# Patient Record
Sex: FEMALE | Race: WHITE | NOT HISPANIC OR LATINO | URBAN - METROPOLITAN AREA
[De-identification: names, ages, dates, MRNs, and addresses within clinical notes are randomized per-mention and may not be internally consistent; named-entity substitution may affect disease eponyms.]

---

## 2023-02-27 PROBLEM — H52.4 PRESBYOPIA OF BOTH EYES: Status: ACTIVE | Noted: 2023-02-27

## 2023-02-27 PROBLEM — N39.3 FEMALE GENUINE STRESS INCONTINENCE: Status: ACTIVE | Noted: 2023-02-27

## 2023-02-27 PROBLEM — K64.0 GRADE I HEMORRHOIDS: Status: ACTIVE | Noted: 2023-02-27

## 2023-02-27 PROBLEM — R31.21 ASYMPTOMATIC MICROSCOPIC HEMATURIA: Status: ACTIVE | Noted: 2023-02-27

## 2023-02-27 PROBLEM — R10.2 FEMALE PELVIC PAIN: Status: ACTIVE | Noted: 2023-02-27

## 2023-02-27 PROBLEM — S86.919A KNEE STRAIN: Status: ACTIVE | Noted: 2023-02-27

## 2023-02-27 PROBLEM — M20.61 TOE DEFORMITY, RIGHT: Status: ACTIVE | Noted: 2023-02-27

## 2023-02-27 PROBLEM — M20.5X1 HALLUX LIMITUS, RIGHT: Status: ACTIVE | Noted: 2023-02-27

## 2023-02-27 PROBLEM — B37.31 YEAST VAGINITIS: Status: ACTIVE | Noted: 2023-02-27

## 2023-02-27 PROBLEM — L90.0 LICHEN SCLEROSUS: Status: ACTIVE | Noted: 2023-02-27

## 2023-02-27 PROBLEM — N81.11 MIDLINE CYSTOCELE: Status: ACTIVE | Noted: 2023-02-27

## 2023-02-27 PROBLEM — R06.83 SNORING: Status: ACTIVE | Noted: 2023-02-27

## 2023-02-27 PROBLEM — N81.6 RECTOCELE, FEMALE: Status: ACTIVE | Noted: 2023-02-27

## 2023-02-27 PROBLEM — N85.2 UTERINE ENLARGEMENT: Status: ACTIVE | Noted: 2023-02-27

## 2023-02-27 PROBLEM — L30.9 ECZEMA: Status: ACTIVE | Noted: 2023-02-27

## 2023-02-27 PROBLEM — J02.9 PHARYNGITIS: Status: ACTIVE | Noted: 2023-02-27

## 2023-02-27 PROBLEM — L73.9 FOLLICULITIS: Status: ACTIVE | Noted: 2023-02-27

## 2023-02-27 PROBLEM — M54.12 CERVICAL RADICULITIS: Status: ACTIVE | Noted: 2023-02-27

## 2023-02-27 PROBLEM — N95.2 VAGINAL ATROPHY: Status: ACTIVE | Noted: 2023-02-27

## 2023-02-27 PROBLEM — R92.8 ABNORMAL MAMMOGRAM: Status: ACTIVE | Noted: 2023-02-27

## 2023-02-27 PROBLEM — N39.0 URINARY TRACT INFECTION: Status: ACTIVE | Noted: 2023-02-27

## 2023-02-27 PROBLEM — D48.5 NEOPLASM OF UNCERTAIN BEHAVIOR OF SKIN: Status: ACTIVE | Noted: 2023-02-27

## 2023-02-27 PROBLEM — H69.90 EUSTACHIAN TUBE DYSFUNCTION: Status: ACTIVE | Noted: 2023-02-27

## 2023-02-27 PROBLEM — N39.0 RECURRENT UTI: Status: ACTIVE | Noted: 2023-02-27
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
## 2023-02-27 PROBLEM — H93.8X9 EAR PRESSURE: Status: ACTIVE | Noted: 2023-02-27

## 2023-02-27 PROBLEM — M25.512 LEFT SHOULDER PAIN: Status: ACTIVE | Noted: 2023-02-27

## 2023-02-27 PROBLEM — M79.676 TOE PAIN: Status: ACTIVE | Noted: 2023-02-27

## 2023-02-27 PROBLEM — N81.9 VAGINAL VAULT PROLAPSE: Status: ACTIVE | Noted: 2023-02-27

## 2023-02-27 PROBLEM — H43.811 PVD (POSTERIOR VITREOUS DETACHMENT), RIGHT EYE: Status: ACTIVE | Noted: 2023-02-27

## 2023-02-27 PROBLEM — N81.10 VAGINAL PROLAPSE: Status: ACTIVE | Noted: 2023-02-27

## 2023-02-27 PROBLEM — J34.89 NASAL SORE: Status: ACTIVE | Noted: 2023-02-27

## 2023-02-27 PROBLEM — R82.90 URINE ABNORMALITY: Status: ACTIVE | Noted: 2023-02-27

## 2023-02-27 PROBLEM — J32.9 SINUS INFECTION: Status: ACTIVE | Noted: 2023-02-27

## 2023-02-27 RX ORDER — CLOTRIMAZOLE AND BETAMETHASONE DIPROPIONATE 10; .64 MG/G; MG/G
1 CREAM TOPICAL 2 TIMES DAILY
COMMUNITY
Start: 2019-08-20

## 2023-02-27 RX ORDER — MELOXICAM 15 MG/1
15 TABLET ORAL DAILY
COMMUNITY
Start: 2022-08-03 | End: 2023-09-22 | Stop reason: ALTCHOICE

## 2023-04-18 ENCOUNTER — OFFICE VISIT (OUTPATIENT)
Dept: PRIMARY CARE | Facility: CLINIC | Age: 58
End: 2023-04-18
Payer: COMMERCIAL

## 2023-04-18 VITALS — TEMPERATURE: 97.8 F

## 2023-04-18 DIAGNOSIS — Z23 NEED FOR VACCINATION: Primary | ICD-10-CM

## 2023-04-18 PROCEDURE — 90750 HZV VACC RECOMBINANT IM: CPT | Performed by: FAMILY MEDICINE

## 2023-04-18 PROCEDURE — 90471 IMMUNIZATION ADMIN: CPT | Performed by: FAMILY MEDICINE

## 2023-05-05 ENCOUNTER — TELEPHONE (OUTPATIENT)
Dept: PRIMARY CARE | Facility: CLINIC | Age: 58
End: 2023-05-05
Payer: COMMERCIAL

## 2023-05-05 NOTE — TELEPHONE ENCOUNTER
Patient called into office because when she was at her OBGYN office her BP was recorded as high.  Patient states 150/? But did not remember the bottom number.  Wanted to come in to have a BP check.  Checked with MA and [facility] and call the office.  Patient was informed but said she did not want to do that.

## 2023-09-05 ENCOUNTER — TELEPHONE (OUTPATIENT)
Dept: PRIMARY CARE | Facility: CLINIC | Age: 58
End: 2023-09-05
Payer: COMMERCIAL

## 2023-09-05 DIAGNOSIS — Z00.00 ROUTINE GENERAL MEDICAL EXAMINATION AT A HEALTH CARE FACILITY: Primary | ICD-10-CM

## 2023-09-22 ENCOUNTER — OFFICE VISIT (OUTPATIENT)
Dept: PRIMARY CARE | Facility: CLINIC | Age: 58
End: 2023-09-22
Payer: COMMERCIAL

## 2023-09-22 VITALS
RESPIRATION RATE: 17 BRPM | SYSTOLIC BLOOD PRESSURE: 130 MMHG | TEMPERATURE: 98 F | HEIGHT: 67 IN | WEIGHT: 192.8 LBS | DIASTOLIC BLOOD PRESSURE: 70 MMHG | OXYGEN SATURATION: 97 % | HEART RATE: 64 BPM | BODY MASS INDEX: 30.26 KG/M2

## 2023-09-22 DIAGNOSIS — Z00.00 ANNUAL PHYSICAL EXAM: Primary | ICD-10-CM

## 2023-09-22 DIAGNOSIS — Z12.11 COLON CANCER SCREENING: ICD-10-CM

## 2023-09-22 PROBLEM — N85.2 UTERINE ENLARGEMENT: Status: RESOLVED | Noted: 2023-02-27 | Resolved: 2023-09-22

## 2023-09-22 PROBLEM — M54.12 CERVICAL RADICULITIS: Status: RESOLVED | Noted: 2023-02-27 | Resolved: 2023-09-22

## 2023-09-22 PROBLEM — J32.9 SINUS INFECTION: Status: RESOLVED | Noted: 2023-02-27 | Resolved: 2023-09-22
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
## 2023-09-22 PROBLEM — L73.9 FOLLICULITIS: Status: RESOLVED | Noted: 2023-02-27 | Resolved: 2023-09-22

## 2023-09-22 PROBLEM — R92.8 ABNORMAL MAMMOGRAM: Status: RESOLVED | Noted: 2023-02-27 | Resolved: 2023-09-22

## 2023-09-22 PROBLEM — M79.676 TOE PAIN: Status: RESOLVED | Noted: 2023-02-27 | Resolved: 2023-09-22

## 2023-09-22 PROBLEM — J34.89 NASAL SORE: Status: RESOLVED | Noted: 2023-02-27 | Resolved: 2023-09-22

## 2023-09-22 PROBLEM — M25.512 LEFT SHOULDER PAIN: Status: RESOLVED | Noted: 2023-02-27 | Resolved: 2023-09-22

## 2023-09-22 PROBLEM — N39.0 URINARY TRACT INFECTION: Status: RESOLVED | Noted: 2023-02-27 | Resolved: 2023-09-22

## 2023-09-22 PROBLEM — N81.11 MIDLINE CYSTOCELE: Status: RESOLVED | Noted: 2023-02-27 | Resolved: 2023-09-22

## 2023-09-22 PROBLEM — M20.61 TOE DEFORMITY, RIGHT: Status: RESOLVED | Noted: 2023-02-27 | Resolved: 2023-09-22

## 2023-09-22 PROBLEM — N39.0 RECURRENT UTI: Status: RESOLVED | Noted: 2023-02-27 | Resolved: 2023-09-22

## 2023-09-22 PROBLEM — L90.0 LICHEN SCLEROSUS: Status: RESOLVED | Noted: 2023-02-27 | Resolved: 2023-09-22

## 2023-09-22 PROBLEM — N81.6 RECTOCELE, FEMALE: Status: RESOLVED | Noted: 2023-02-27 | Resolved: 2023-09-22

## 2023-09-22 PROBLEM — H93.8X9 EAR PRESSURE: Status: RESOLVED | Noted: 2023-02-27 | Resolved: 2023-09-22

## 2023-09-22 PROBLEM — H43.811 PVD (POSTERIOR VITREOUS DETACHMENT), RIGHT EYE: Status: RESOLVED | Noted: 2023-02-27 | Resolved: 2023-09-22

## 2023-09-22 PROBLEM — H52.4 PRESBYOPIA OF BOTH EYES: Status: RESOLVED | Noted: 2023-02-27 | Resolved: 2023-09-22

## 2023-09-22 PROBLEM — D48.5 NEOPLASM OF UNCERTAIN BEHAVIOR OF SKIN: Status: RESOLVED | Noted: 2023-02-27 | Resolved: 2023-09-22

## 2023-09-22 PROBLEM — R82.90 URINE ABNORMALITY: Status: RESOLVED | Noted: 2023-02-27 | Resolved: 2023-09-22

## 2023-09-22 PROBLEM — R10.2 FEMALE PELVIC PAIN: Status: RESOLVED | Noted: 2023-02-27 | Resolved: 2023-09-22

## 2023-09-22 PROBLEM — H69.90 EUSTACHIAN TUBE DYSFUNCTION: Status: RESOLVED | Noted: 2023-02-27 | Resolved: 2023-09-22

## 2023-09-22 PROBLEM — S86.919A KNEE STRAIN: Status: RESOLVED | Noted: 2023-02-27 | Resolved: 2023-09-22

## 2023-09-22 PROBLEM — J02.9 PHARYNGITIS: Status: RESOLVED | Noted: 2023-02-27 | Resolved: 2023-09-22

## 2023-09-22 PROBLEM — M20.5X1 HALLUX LIMITUS, RIGHT: Status: RESOLVED | Noted: 2023-02-27 | Resolved: 2023-09-22

## 2023-09-22 PROBLEM — N81.9 VAGINAL VAULT PROLAPSE: Status: RESOLVED | Noted: 2023-02-27 | Resolved: 2023-09-22

## 2023-09-22 PROBLEM — K64.0 GRADE I HEMORRHOIDS: Status: RESOLVED | Noted: 2023-02-27 | Resolved: 2023-09-22

## 2023-09-22 PROBLEM — N81.10 VAGINAL PROLAPSE: Status: RESOLVED | Noted: 2023-02-27 | Resolved: 2023-09-22

## 2023-09-22 PROBLEM — R31.21 ASYMPTOMATIC MICROSCOPIC HEMATURIA: Status: RESOLVED | Noted: 2023-02-27 | Resolved: 2023-09-22

## 2023-09-22 PROBLEM — B37.31 YEAST VAGINITIS: Status: RESOLVED | Noted: 2023-02-27 | Resolved: 2023-09-22

## 2023-09-22 PROCEDURE — 1036F TOBACCO NON-USER: CPT | Performed by: FAMILY MEDICINE

## 2023-09-22 PROCEDURE — 99396 PREV VISIT EST AGE 40-64: CPT | Performed by: FAMILY MEDICINE

## 2023-09-22 ASSESSMENT — PATIENT HEALTH QUESTIONNAIRE - PHQ9
8. MOVING OR SPEAKING SO SLOWLY THAT OTHER PEOPLE COULD HAVE NOTICED. OR THE OPPOSITE, BEING SO FIGETY OR RESTLESS THAT YOU HAVE BEEN MOVING AROUND A LOT MORE THAN USUAL: NOT AT ALL
5. POOR APPETITE OR OVEREATING: NOT AT ALL
4. FEELING TIRED OR HAVING LITTLE ENERGY: NOT AT ALL
3. TROUBLE FALLING OR STAYING ASLEEP OR SLEEPING TOO MUCH: NOT AT ALL
1. LITTLE INTEREST OR PLEASURE IN DOING THINGS: NOT AT ALL
6. FEELING BAD ABOUT YOURSELF - OR THAT YOU ARE A FAILURE OR HAVE LET YOURSELF OR YOUR FAMILY DOWN: NOT AT ALL
SUM OF ALL RESPONSES TO PHQ9 QUESTIONS 1 AND 2: 0
7. TROUBLE CONCENTRATING ON THINGS, SUCH AS READING THE NEWSPAPER OR WATCHING TELEVISION: NOT AT ALL
SUM OF ALL RESPONSES TO PHQ QUESTIONS 1-9: 0
9. THOUGHTS THAT YOU WOULD BE BETTER OFF DEAD, OR OF HURTING YOURSELF: NOT AT ALL
10. IF YOU CHECKED OFF ANY PROBLEMS, HOW DIFFICULT HAVE THESE PROBLEMS MADE IT FOR YOU TO DO YOUR WORK, TAKE CARE OF THINGS AT HOME, OR GET ALONG WITH OTHER PEOPLE: NOT DIFFICULT AT ALL
2. FEELING DOWN, DEPRESSED OR HOPELESS: NOT AT ALL

## 2023-09-22 NOTE — ASSESSMENT & PLAN NOTE
Declines flu vaccine.  Schedule fasting labs, mammogram and colonoscopy.  She will call for colonoscopy scheduling.  Continue regular physical activity and diet changes.  Return in 1 year or as needed

## 2023-09-22 NOTE — PROGRESS NOTES
Subjective   Patient ID: Gracie Landaverde is a 58 y.o. female who presents for No chief complaint on file..    Here for CPE.    Last Pap through GYN 4/23  Mammogram 11/22  Colonoscopy 9/18 recheck in 5, due this year    Seeing gyn regular  Was to get surg and still considering for prolapse.    Weight up slight from last check  Trying to limit sugar   Walking regular, occ strength training.  Brushing teeth, seeing dentist  Sleeping well.  Vision stable  Hearing normal.   No tobacco, no etoh, no drugs  Using premarin vag cream 1-2 times a week  No longer needing mobic for knee.  Taking vit D collagen powder.   Declines flu vaccine.             Review of Systems    Objective   There were no vitals taken for this visit.    Physical Exam  Vitals and nursing note reviewed.   Constitutional:       Appearance: Normal appearance.   HENT:      Head: Normocephalic.      Right Ear: Tympanic membrane normal.      Left Ear: Tympanic membrane normal.      Nose: Nose normal.      Mouth/Throat:      Mouth: Mucous membranes are dry.   Eyes:      Extraocular Movements: Extraocular movements intact.      Pupils: Pupils are equal, round, and reactive to light.   Cardiovascular:      Rate and Rhythm: Normal rate and regular rhythm.      Pulses: Normal pulses.   Pulmonary:      Effort: Pulmonary effort is normal.      Breath sounds: Normal breath sounds.   Abdominal:      General: Abdomen is flat. Bowel sounds are normal.      Palpations: Abdomen is soft.   Musculoskeletal:         General: Normal range of motion.      Cervical back: Normal range of motion.   Skin:     General: Skin is warm and dry.   Neurological:      General: No focal deficit present.      Mental Status: She is alert and oriented to person, place, and time.   Psychiatric:         Mood and Affect: Mood normal.         Behavior: Behavior normal.         Thought Content: Thought content normal.         Judgment: Judgment normal.       Assessment/Plan   Problem List  Items Addressed This Visit       Annual physical exam - Primary     Declines flu vaccine.  Schedule fasting labs, mammogram and colonoscopy.  She will call for colonoscopy scheduling.  Continue regular physical activity and diet changes.  Return in 1 year or as needed         Relevant Orders    CBC and Auto Differential    Comprehensive Metabolic Panel    Lipid Panel

## 2023-09-23 ENCOUNTER — LAB (OUTPATIENT)
Dept: LAB | Facility: LAB | Age: 58
End: 2023-09-23
Payer: COMMERCIAL

## 2023-09-23 DIAGNOSIS — Z00.00 ANNUAL PHYSICAL EXAM: ICD-10-CM

## 2023-09-23 LAB
BASOPHILS (10*3/UL) IN BLOOD BY AUTOMATED COUNT: 0.05 X10E9/L (ref 0–0.1)
BASOPHILS/100 LEUKOCYTES IN BLOOD BY AUTOMATED COUNT: 0.7 % (ref 0–2)
EOSINOPHILS (10*3/UL) IN BLOOD BY AUTOMATED COUNT: 0.36 X10E9/L (ref 0–0.7)
EOSINOPHILS/100 LEUKOCYTES IN BLOOD BY AUTOMATED COUNT: 4.8 % (ref 0–6)
ERYTHROCYTE DISTRIBUTION WIDTH (RATIO) BY AUTOMATED COUNT: 12.8 % (ref 11.5–14.5)
ERYTHROCYTE MEAN CORPUSCULAR HEMOGLOBIN CONCENTRATION (G/DL) BY AUTOMATED: 32.1 G/DL (ref 32–36)
ERYTHROCYTE MEAN CORPUSCULAR VOLUME (FL) BY AUTOMATED COUNT: 90 FL (ref 80–100)
ERYTHROCYTES (10*6/UL) IN BLOOD BY AUTOMATED COUNT: 4.94 X10E12/L (ref 4–5.2)
HEMATOCRIT (%) IN BLOOD BY AUTOMATED COUNT: 44.6 % (ref 36–46)
HEMOGLOBIN (G/DL) IN BLOOD: 14.3 G/DL (ref 12–16)
IMMATURE GRANULOCYTES/100 LEUKOCYTES IN BLOOD BY AUTOMATED COUNT: 0.3 % (ref 0–0.9)
LEUKOCYTES (10*3/UL) IN BLOOD BY AUTOMATED COUNT: 7.4 X10E9/L (ref 4.4–11.3)
LYMPHOCYTES (10*3/UL) IN BLOOD BY AUTOMATED COUNT: 2.99 X10E9/L (ref 1.2–4.8)
LYMPHOCYTES/100 LEUKOCYTES IN BLOOD BY AUTOMATED COUNT: 40.2 % (ref 13–44)
MONOCYTES (10*3/UL) IN BLOOD BY AUTOMATED COUNT: 0.42 X10E9/L (ref 0.1–1)
MONOCYTES/100 LEUKOCYTES IN BLOOD BY AUTOMATED COUNT: 5.6 % (ref 2–10)
NEUTROPHILS (10*3/UL) IN BLOOD BY AUTOMATED COUNT: 3.6 X10E9/L (ref 1.2–7.7)
NEUTROPHILS/100 LEUKOCYTES IN BLOOD BY AUTOMATED COUNT: 48.4 % (ref 40–80)
NRBC (PER 100 WBCS) BY AUTOMATED COUNT: 0 /100 WBC (ref 0–0)
PLATELETS (10*3/UL) IN BLOOD AUTOMATED COUNT: 365 X10E9/L (ref 150–450)

## 2023-09-23 PROCEDURE — 80053 COMPREHEN METABOLIC PANEL: CPT

## 2023-09-23 PROCEDURE — 36415 COLL VENOUS BLD VENIPUNCTURE: CPT

## 2023-09-23 PROCEDURE — 80061 LIPID PANEL: CPT

## 2023-09-23 PROCEDURE — 85025 COMPLETE CBC W/AUTO DIFF WBC: CPT

## 2023-09-24 LAB
ALANINE AMINOTRANSFERASE (SGPT) (U/L) IN SER/PLAS: 15 U/L (ref 7–45)
ALBUMIN (G/DL) IN SER/PLAS: 4.1 G/DL (ref 3.4–5)
ALKALINE PHOSPHATASE (U/L) IN SER/PLAS: 97 U/L (ref 33–110)
ANION GAP IN SER/PLAS: 13 MMOL/L (ref 10–20)
ASPARTATE AMINOTRANSFERASE (SGOT) (U/L) IN SER/PLAS: 17 U/L (ref 9–39)
BILIRUBIN TOTAL (MG/DL) IN SER/PLAS: 0.5 MG/DL (ref 0–1.2)
CALCIUM (MG/DL) IN SER/PLAS: 9.5 MG/DL (ref 8.6–10.6)
CARBON DIOXIDE, TOTAL (MMOL/L) IN SER/PLAS: 27 MMOL/L (ref 21–32)
CHLORIDE (MMOL/L) IN SER/PLAS: 107 MMOL/L (ref 98–107)
CHOLESTEROL (MG/DL) IN SER/PLAS: 179 MG/DL (ref 0–199)
CHOLESTEROL IN HDL (MG/DL) IN SER/PLAS: 46.9 MG/DL
CHOLESTEROL/HDL RATIO: 3.8
CREATININE (MG/DL) IN SER/PLAS: 0.74 MG/DL (ref 0.5–1.05)
GFR FEMALE: >90 ML/MIN/1.73M2
GLUCOSE (MG/DL) IN SER/PLAS: 94 MG/DL (ref 74–99)
LDL: 110 MG/DL (ref 0–99)
POTASSIUM (MMOL/L) IN SER/PLAS: 4.5 MMOL/L (ref 3.5–5.3)
PROTEIN TOTAL: 7.2 G/DL (ref 6.4–8.2)
SODIUM (MMOL/L) IN SER/PLAS: 142 MMOL/L (ref 136–145)
TRIGLYCERIDE (MG/DL) IN SER/PLAS: 111 MG/DL (ref 0–149)
UREA NITROGEN (MG/DL) IN SER/PLAS: 21 MG/DL (ref 6–23)
VLDL: 22 MG/DL (ref 0–40)

## 2023-11-30 DIAGNOSIS — R92.8 ABNORMAL MAMMOGRAM OF RIGHT BREAST: Primary | ICD-10-CM

## 2024-04-15 ENCOUNTER — APPOINTMENT (OUTPATIENT)
Dept: OBSTETRICS AND GYNECOLOGY | Facility: CLINIC | Age: 59
End: 2024-04-15
Payer: COMMERCIAL

## 2024-04-21 NOTE — PROGRESS NOTES
Urogynecology  Provider:  Melba Simpson MD  [phone]              ASSESSMENT AND PLAN:     Problem List Items Addressed This Visit    None          I spent a total of eConsult Time: 25 minutes in face to face and non face to face time.        Melba Simpson MD        HISTORY OF PRESENT ILLNESS:     Last seen 2023  She is quite bothered by POP  pap done today as well  We discussed risks and benefits of proceeding with a repair and she would like to have surgery  Will plan for posterior repair with OASIS graft and perineorraphy.  She will schedule with [facility]   POP-Q: Stage: 3~and~patient was standing. Aa: -3. C: -9 TVL: 9 Ap: +2. D: x.       At least visit pt had planned to proceed with surgery and then got cold feet.  Not really bothered enough for the OR.  She feels that her POP is the same and no change.  Happy overall.    Record Review:     Prolapse Symptoms :     Urinary Symptoms:     Bowel Symptoms:     Sexual Activity:          Past Medical History:      Past Medical History:   Diagnosis Date    Diverticulosis of intestine, part unspecified, without perforation or abscess without bleeding     Diverticulosis    Encounter for screening for malignant neoplasm of vagina     Vaginal Pap smear    Midline cystocele 2023    Other conditions influencing health status     Complete Spontaneous  Without Complications    Personal history of other benign neoplasm     History of uterine leiomyoma    Personal history of other complications of pregnancy, childbirth and the puerperium     History of miscarriage    Personal history of other medical treatment     History of mammogram    PVD (posterior vitreous detachment), right eye 2023    Rectocele, female 2023    Vaginal vault prolapse 2023          Past Surgical History:       Past Surgical History:   Procedure Laterality Date    APPENDECTOMY  2016    Appendectomy     SECTION, CLASSIC  2014      "Section    HYSTERECTOMY  03/13/2015    Hysterectomy    TUBAL LIGATION  01/06/2014    Tubal Ligation         Medications:       Prior to Admission medications    Medication Sig Start Date End Date Taking? Authorizing Provider   clotrimazole-betamethasone (Lotrisone) cream Apply 1 Application topically 2 times a day. 8/20/19   Historical Provider, MD   estrogens, conjugated, (Premarin) vaginal cream Insert 0.5 g into the vagina 2 times a week. 6/7/21   Historical Provider, MD CAMARA  Review of Systems     Blood, Urine   Date Value Ref Range Status   08/19/2020 NEGATIVE NEGATIVE Final     Nitrite, Urine   Date Value Ref Range Status   08/19/2020 NEGATIVE NEGATIVE Final     Urobilinogen, Urine   Date Value Ref Range Status   08/19/2020 <2.0 0.0 - 1.9 mg/dL Final         PHYSICAL EXAM:      There were no vitals taken for this visit.     No LMP recorded. Patient has had a hysterectomy.      Declines chaperone for physical exam.      Well developed, well nourished, in no apparent distress.   Neurologic/Psychiatric:  Awake, Alert and Oriented times 3.  Affect normal.     GENITAL/URINARY:       External Genitalia:  The patient has normal appearing external genitalia, normal skenes and bartholins glands, and a normal hair distribution.  Her vulva is without lesions, erythema or discharge.  It is non-tender with appropriate sensation.     Urethral Meatus:  Size normal, Location normal, Lesions absent, Prolapse absent,      Urethra:  Fullness absent, Masses absent,      Bladder:  Fullness absent, Masses absent, Tenderness absent,      Vagina:  General appearance normal, Estrogen effect normal, Discharge absent, Lesions absent, wnl     Cervix: Normal, no discharge.   Uterus:  wnl  Adnexa: normal    POP-Q:  Stage: 3  Position: standing    Aa: -3       Ba:  C: -9   Gh:  Pb:  TVL: 9         Ap: +2 Bp:  D: -10               Data and DIAGNOSTIC STUDIES REVIEWED   No results found.   No results found for: \"URINECULTURE\", " "\"[employer]\"   Lab Results   Component Value Date    GLUCOSE 94 09/23/2023    CALCIUM 9.5 09/23/2023     09/23/2023    K 4.5 09/23/2023    CO2 27 09/23/2023     09/23/2023    BUN 21 09/23/2023    CREATININE 0.74 09/23/2023     Lab Results   Component Value Date    WBC 7.4 09/23/2023    HGB 14.3 09/23/2023    HCT 44.6 09/23/2023    MCV 90 09/23/2023     09/23/2023      Prolapse is stable and she is not bothered  We discussed that should she want repair it would be a transvaginal posterior repair, but 50/50 chance that it will get worse and surgery will be needed.  She is not constipated.  Doing well overall.  She will F/U with me in 1 year.  I am doing her gyn care. Pap done by me last year.  Mammo JOSE J Simpson MD        "

## 2024-04-22 ENCOUNTER — OFFICE VISIT (OUTPATIENT)
Dept: OBSTETRICS AND GYNECOLOGY | Facility: CLINIC | Age: 59
End: 2024-04-22
Payer: COMMERCIAL

## 2024-04-22 VITALS — SYSTOLIC BLOOD PRESSURE: 149 MMHG | BODY MASS INDEX: 30.54 KG/M2 | DIASTOLIC BLOOD PRESSURE: 85 MMHG | WEIGHT: 195 LBS

## 2024-04-22 DIAGNOSIS — N81.9 VAGINAL VAULT PROLAPSE: Primary | ICD-10-CM

## 2024-04-22 PROCEDURE — 99213 OFFICE O/P EST LOW 20 MIN: CPT | Performed by: OBSTETRICS & GYNECOLOGY

## 2024-04-22 PROCEDURE — 1036F TOBACCO NON-USER: CPT | Performed by: OBSTETRICS & GYNECOLOGY

## 2024-04-22 ASSESSMENT — PAIN SCALES - GENERAL: PAINLEVEL: 0-NO PAIN

## 2024-10-18 ENCOUNTER — TELEPHONE (OUTPATIENT)
Dept: PRIMARY CARE | Facility: CLINIC | Age: 59
End: 2024-10-18
Payer: COMMERCIAL

## 2024-10-18 NOTE — TELEPHONE ENCOUNTER
Patient left a voicemail to reschedule physical on 10.22.24 patient already rescheduled on my chart

## 2024-10-22 ENCOUNTER — APPOINTMENT (OUTPATIENT)
Dept: PRIMARY CARE | Facility: CLINIC | Age: 59
End: 2024-10-22
Payer: COMMERCIAL

## 2024-12-03 ENCOUNTER — APPOINTMENT (OUTPATIENT)
Dept: PRIMARY CARE | Facility: CLINIC | Age: 59
End: 2024-12-03
Payer: COMMERCIAL

## 2025-01-08 ENCOUNTER — APPOINTMENT (OUTPATIENT)
Dept: PRIMARY CARE | Facility: CLINIC | Age: 60
End: 2025-01-08
Payer: COMMERCIAL

## 2025-03-28 ENCOUNTER — TELEPHONE (OUTPATIENT)
Dept: OBSTETRICS AND GYNECOLOGY | Facility: CLINIC | Age: 60
End: 2025-03-28
Payer: COMMERCIAL

## 2025-03-28 DIAGNOSIS — N89.8 VAGINAL ITCHING: ICD-10-CM

## 2025-03-28 DIAGNOSIS — N95.2 VAGINAL ATROPHY: ICD-10-CM

## 2025-03-28 NOTE — TELEPHONE ENCOUNTER
Request received for   Requested Prescriptions     Pending Prescriptions Disp Refills    clotrimazole-betamethasone (Lotrisone) cream 45 g 1     Sig: Apply 1 Application topically 2 times a day.    estrogens, conjugated, (Premarin) vaginal cream 30 g 1     Sig: Insert 0.5 g into the vagina 2 times a week.       Gracie Landaverde was last seen 4/22/2024 and has an appt for 4/28/2025.

## 2025-03-29 RX ORDER — CLOTRIMAZOLE AND BETAMETHASONE DIPROPIONATE 10; .64 MG/G; MG/G
1 CREAM TOPICAL 2 TIMES DAILY
Qty: 45 G | Refills: 1 | Status: SHIPPED | OUTPATIENT
Start: 2025-03-29

## 2025-04-27 NOTE — PROGRESS NOTES
Urogynecology  Provider:  Melba Simpson MD  [phone]    ASSESSMENT AND PLAN:   60 year old female with stage 3 rectocele with Ap at +2 today upon exam.     Diagnoses:  #1 Rectocele, stage 3    Plan:  1. Rectocele, stage 3  - Upon exam today standing/straining Pop-Q was Aa -3, C -8, TVL 10, Ap +2, and D was surgically absent.   - Reviewed POP management options such as conservative management, fitting her with a pessary, or scheduling a POP repair surgery (I.e. KY with OASIS graft and perineorrhaphy).   0 We reviewed the risks, benefits, surgical steps, and expected postoperative recovery of the isolated KY with OASIS graft and perineorrhaphy. This surgery involves suturing the posterior vaginal wall and perineum due to having an enlarged perineal body on exam to reduce risk of POP recurrence. The posterior vaginal wall will be supported with an OASIS graft which is a piece of donor porcine intestinal skin placed for further support that dissolves within 2-3 months after surgery to provide reinforcement for the tissue to scar over to lower risk of POP recurrence. There is a 10-20% risk of prolapse recurrence and this risk can be further reduced with constipation management. The general risks of surgery involve bleeding, infection, and damage to surrounding organs with need for further surgery. There is a 30% risk of POUR in which she would be discharged home with a Sullivan catheter until she returns to clinic the next day and passes her voiding trial. She will be given limited Rx of Oxycodone for breakthrough pain and can take OTC Tylenol/IBU for pain management. Postoperative recovery includes pelvic rest and no lifting over 10-15 pounds for a total of x6 weeks.   > She is interested in proceeding with a POP repair surgery to include a KY with OASIS graft with perineorrhaphy in the upcoming Summer 2025.   - Will need PAT appointment.   - Preoperative virtual visit in June prior to her surgery to address any  patient questions/concerns.   - Case request sent for OK with OASIS graft, perineorrhaphy, and cystoscopy with Dr. Simpson @ [facility] in 6/24/2025.     Follow up 2 weeks after surgery with Dr. Simpson for her first postoperative visit.     Scribe Attestation  By signing my name below, I, Uri Chew, Leeibe, attest that this documentation has been prepared under the direction and in the presence of Melba Simpson MD on 04/28/2025 at 2:50 PM.     Agree with above. I Dr. Simpson, personally performed the services described in the documentation which was scribed virtually and confirm it is both complete and accurate.  Melba Simpson MD      Problem List Items Addressed This Visit    None  Visit Diagnoses         Urinary disorder    -  Primary    Relevant Orders    POCT UA Automated manually resulted                I spent a total of Consult Time:30 minutes in face to face and non face to face time.        Melba Simpson MD        HISTORY OF PRESENT ILLNESS:   60 year old female presenting for a POP check.     Records Review:   - Last visit 4/2024  Prolapse is stable and she is not bothered  We discussed that should she want repair it would be a transvaginal posterior repair, but 50/50 chance that it will get worse and surgery will be needed.  She is not constipated.  Doing well overall.  She will F/U with me in 1 year.  I am doing her gyn care. Pap done by me last year.  Mammo UTD    Aa -3 C -9 Ap +2 TVL 9 D -10     Prolapse Symptoms:  - Patient is bothered by her POP and denies any difficulty in emptying her bladder.   - Denies any worsening of her POP since her last pelvic exam x1 year ago.   - The patient prefers to schedule a POP repair surgery in June/July 2025 in include a OK with OASIS graft.   - Denies experiencing constipation recently.     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        Past Medical History:    Medical History[1]       Past  "Surgical History:     Surgical History[2]      Medications:     Prior to Admission medications    Medication Sig Start Date End Date Taking? Authorizing Provider   clotrimazole-betamethasone (Lotrisone) cream Apply 1 Application topically 2 times a day. 3/29/25   Melba Simpson MD   estrogens, conjugated, (Premarin) vaginal cream Insert 0.5 g into the vagina 2 times a week. 3/31/25   Melba Simpson MD        ROS  Review of Systems   Constitutional: Negative.    HENT: Negative.     Eyes: Negative.    Respiratory: Negative.     Cardiovascular: Negative.    Gastrointestinal: Negative.    Endocrine: Negative.    Genitourinary: Negative.    Musculoskeletal: Negative.    Neurological: Negative.    Psychiatric/Behavioral: Negative.          Blood, Urine   Date Value Ref Range Status   08/19/2020 NEGATIVE NEGATIVE Final     Nitrite, Urine   Date Value Ref Range Status   08/19/2020 NEGATIVE NEGATIVE Final     Urobilinogen, Urine   Date Value Ref Range Status   08/19/2020 <2.0 0.0 - 1.9 mg/dL Final         PHYSICAL EXAM:    /84 (Patient Position: Sitting)   Pulse 82   Ht 1.702 m (5' 7\")   Wt 87.3 kg (192 lb 6.4 oz)   BMI 30.13 kg/m²   No LMP recorded. Patient has had a hysterectomy.      Declines chaperone for physical exam.      Well developed, well nourished, in no apparent distress.   Neurologic/Psychiatric:  Awake, Alert and Oriented times 3.  Affect normal.     GENITAL/URINARY:     External Genitalia:  The patient has normal appearing external genitalia, normal skenes and bartholins glands, and a normal hair distribution.  Her vulva is without lesions, erythema or discharge.  It is non-tender with appropriate sensation.     Urethral Meatus:  Size normal, Location normal, Lesions absent, Prolapse absent.    Urethra:  Fullness absent, Masses absent.    Bladder:  Fullness absent, Masses absent, Tenderness absent.     Vagina:  General appearance normal, Estrogen effect normal, Discharge absent, Lesions " "absent.     Cervix: surgically absent  Uterus:  surgically absent  Adnexa: normal, no masses or tenderness over the bilateral adnexa      Anus/Perineum:  Lesions absent and masses absent.     Stress urinary incontinence not demonstrable.         POP-Q:  Stage: 3  Position: standing straining    Aa: -3       Ba:  C: -9   Gh:  Pb:  TVL: 10         Ap: +2 Bp:  D: X             Rectum: Normal.      Data and DIAGNOSTIC STUDIES REVIEWED   Imaging  No results found.    Cardiology, Vascular, and Other Imaging  No other imaging results found for the past 7 days     No results found for: \"URINECULTURE\", \"[employer]\"   Lab Results   Component Value Date    GLUCOSE 94 2023    CALCIUM 9.5 2023     2023    K 4.5 2023    CO2 27 2023     2023    BUN 21 2023    CREATININE 0.74 2023     Lab Results   Component Value Date    WBC 7.4 2023    HGB 14.3 2023    HCT 44.6 2023    MCV 90 2023     2023          Melba Simpson MD         [1]   Past Medical History:  Diagnosis Date    Diverticulosis of intestine, part unspecified, without perforation or abscess without bleeding     Diverticulosis    Encounter for screening for malignant neoplasm of vagina     Vaginal Pap smear    Midline cystocele 2023    Other conditions influencing health status     Complete Spontaneous  Without Complications    Personal history of other benign neoplasm     History of uterine leiomyoma    Personal history of other complications of pregnancy, childbirth and the puerperium     History of miscarriage    Personal history of other medical treatment     History of mammogram    PVD (posterior vitreous detachment), right eye 2023    Rectocele, female 2023    Vaginal vault prolapse 2023   [2]   Past Surgical History:  Procedure Laterality Date    APPENDECTOMY  2016    Appendectomy     SECTION, CLASSIC  2014    "  Section    HYSTERECTOMY  2015    Hysterectomy    TUBAL LIGATION  2014    Tubal Ligation

## 2025-04-28 ENCOUNTER — PREP FOR PROCEDURE (OUTPATIENT)
Dept: OBSTETRICS AND GYNECOLOGY | Facility: CLINIC | Age: 60
End: 2025-04-28
Payer: COMMERCIAL

## 2025-04-28 ENCOUNTER — OFFICE VISIT (OUTPATIENT)
Dept: OBSTETRICS AND GYNECOLOGY | Facility: CLINIC | Age: 60
End: 2025-04-28
Payer: COMMERCIAL

## 2025-04-28 VITALS
BODY MASS INDEX: 30.2 KG/M2 | DIASTOLIC BLOOD PRESSURE: 84 MMHG | WEIGHT: 192.4 LBS | SYSTOLIC BLOOD PRESSURE: 138 MMHG | HEIGHT: 67 IN | HEART RATE: 82 BPM

## 2025-04-28 DIAGNOSIS — N81.6 RECTOCELE: Primary | ICD-10-CM

## 2025-04-28 DIAGNOSIS — N39.9 URINARY DISORDER: Primary | ICD-10-CM

## 2025-04-28 PROCEDURE — 1036F TOBACCO NON-USER: CPT | Performed by: OBSTETRICS & GYNECOLOGY

## 2025-04-28 PROCEDURE — 99214 OFFICE O/P EST MOD 30 MIN: CPT | Performed by: OBSTETRICS & GYNECOLOGY

## 2025-04-28 PROCEDURE — 3008F BODY MASS INDEX DOCD: CPT | Performed by: OBSTETRICS & GYNECOLOGY

## 2025-04-28 RX ORDER — PHENAZOPYRIDINE HYDROCHLORIDE 200 MG/1
200 TABLET, FILM COATED ORAL ONCE
OUTPATIENT
Start: 2025-04-28 | End: 2025-04-28

## 2025-04-28 RX ORDER — CELECOXIB 400 MG/1
400 CAPSULE ORAL ONCE
OUTPATIENT
Start: 2025-04-28 | End: 2025-04-28

## 2025-04-28 RX ORDER — GABAPENTIN 600 MG/1
600 TABLET ORAL ONCE
OUTPATIENT
Start: 2025-04-28 | End: 2025-04-28

## 2025-04-28 RX ORDER — ACETAMINOPHEN 325 MG/1
975 TABLET ORAL ONCE
OUTPATIENT
Start: 2025-04-28 | End: 2025-04-28

## 2025-04-28 ASSESSMENT — ENCOUNTER SYMPTOMS
EYES NEGATIVE: 1
RESPIRATORY NEGATIVE: 1
GASTROINTESTINAL NEGATIVE: 1
CONSTITUTIONAL NEGATIVE: 1
CARDIOVASCULAR NEGATIVE: 1
ENDOCRINE NEGATIVE: 1
MUSCULOSKELETAL NEGATIVE: 1
PSYCHIATRIC NEGATIVE: 1
NEUROLOGICAL NEGATIVE: 1

## 2025-04-28 ASSESSMENT — PAIN SCALES - GENERAL: PAINLEVEL_OUTOF10: 0-NO PAIN

## 2025-04-29 ENCOUNTER — TELEPHONE (OUTPATIENT)
Dept: OBSTETRICS AND GYNECOLOGY | Facility: CLINIC | Age: 60
End: 2025-04-29
Payer: COMMERCIAL

## 2025-04-29 NOTE — TELEPHONE ENCOUNTER
My Chart message sent informing pt that [facility] will call her to schedule in the order she receives the requests.

## 2025-05-01 PROBLEM — N81.6 RECTOCELE: Status: ACTIVE | Noted: 2025-04-28

## 2025-06-01 NOTE — PROGRESS NOTES
Urogynecology  Provider:  Melba Simpson MD  [phone]    ASSESSMENT AND PLAN:   60 year old female with a rectocele.    Diagnoses:  #1 Rectocele    Plan:  1. Rectocele   - We reviewed the risks, benefits, surgical steps, and expected postoperative recovery of the isolated MN with OASIS graft and perineorrhaphy. This surgery involves suturing the posterior vaginal wall and perineum due to having an enlarged perineal body on exam to reduce risk of POP recurrence. The posterior vaginal wall will re-approximated and supported with an OASIS graft which is a piece of donor porcine intestinal skin placed for further support that dissolves within 2-3 months after surgery to provide reinforcement for the tissue to scar over to lower risk of POP recurrence. There is a 10-20% risk of prolapse recurrence and this risk can be further reduced with constipation management. The general risks of surgery involve bleeding, infection, and damage to surrounding organs with need for further surgery. There is a 30% risk of POUR in which she would be discharged home with a Sullivan catheter until she returns to clinic the next day and passes her voiding trial. She will be given limited Rx of Oxycodone for breakthrough pain and can take OTC Tylenol/IBU for pain management. Postoperative recovery includes pelvic rest and no lifting over 10-15 pounds for a total of x6 weeks.   - PAT will need to be scheduled due to her age.   - The patient is scheduled on 7/9/2025 for MN with OASIS graft, perineorrhaphy, and cystoscopy with Dr. Simpson @ [facility].     Follow up 2 weeks after surgery with Dr. Simpson for her first postoperative visit.     Telehealth visit time: 5 minutes     Scribe Attestation  By signing my name below, I, Kindra Romero, attest that this documentation has been prepared under the direction and in the presence of Melba Simpson MD on 06/02/2025 at 11:17 PM.     Agree with above. I Dr. Simpson, personally  performed the services described in the documentation which was scribed virtually and confirm it is both complete and accurate.  Melba Simpson MD        Problem List Items Addressed This Visit          Genitourinary and Reproductive    Rectocele - Primary    Relevant Orders    Request for Pre-Admission Testing Visit        Virtual or Telephone Consent:   An interactive audio and video telecommunication system which permits real time communications between the patient (at the originating site) and provider (at the distant site) was utilized to provide this telehealth service.   Verbal consent was requested and obtained from Gracie Landaverde on this date, 06/02/25 for a telehealth visit and the patient's location was confirmed at the time of the visit.     I spent a total of 11 minutes in face to face and non face to face time at this virtual visit.          Melba Simpson MD        HISTORY OF PRESENT ILLNESS:   60 year old female presenting in virtual visit follow up for preoperative counseling. She is scheduled on 7/9/2025 @ [facility] for IA with OASIS graft, perineorrhaphy, and cystoscopy.     Prolapse Symptoms:  - She is interested in proceeding with her scheduled surgery and all questions were answered.        Past Medical History:     Medical History[1]       Past Surgical History:     Surgical History[2]      Medications:     Prior to Admission medications    Medication Sig Start Date End Date Taking? Authorizing Provider   clotrimazole-betamethasone (Lotrisone) cream Apply 1 Application topically 2 times a day. 3/29/25   Melba Simpson MD   estrogens, conjugated, (Premarin) vaginal cream Insert 0.5 g into the vagina 2 times a week. 3/31/25   Melba Simpson MD        ROS  Review of Systems   Constitutional: Negative.    HENT: Negative.     Eyes: Negative.    Respiratory: Negative.     Cardiovascular: Negative.    Gastrointestinal: Negative.    Endocrine: Negative.    Genitourinary:  "Negative.    Musculoskeletal: Negative.    Neurological: Negative.    Psychiatric/Behavioral: Negative.            Data and DIAGNOSTIC STUDIES REVIEWED   Imaging  No results found.    Cardiology, Vascular, and Other Imaging  No other imaging results found for the past 7 days     No results found for: \"URINECULTURE\", \"[employer]\"   Lab Results   Component Value Date    GLUCOSE 94 2023    CALCIUM 9.5 2023     2023    K 4.5 2023    CO2 27 2023     2023    BUN 21 2023    CREATININE 0.74 2023     Lab Results   Component Value Date    WBC 7.4 2023    HGB 14.3 2023    HCT 44.6 2023    MCV 90 2023     2023             [1]   Past Medical History:  Diagnosis Date    Diverticulosis of intestine, part unspecified, without perforation or abscess without bleeding     Diverticulosis    Encounter for screening for malignant neoplasm of vagina     Vaginal Pap smear    Midline cystocele 2023    Other conditions influencing health status     Complete Spontaneous  Without Complications    Personal history of other benign neoplasm     History of uterine leiomyoma    Personal history of other complications of pregnancy, childbirth and the puerperium     History of miscarriage    Personal history of other medical treatment     History of mammogram    PVD (posterior vitreous detachment), right eye 2023    Rectocele, female 2023    Vaginal vault prolapse 2023   [2]   Past Surgical History:  Procedure Laterality Date    APPENDECTOMY  2016    Appendectomy     SECTION, CLASSIC  2014     Section    HYSTERECTOMY  2015    Hysterectomy    TUBAL LIGATION  2014    Tubal Ligation     "

## 2025-06-02 ENCOUNTER — TELEMEDICINE (OUTPATIENT)
Dept: OBSTETRICS AND GYNECOLOGY | Facility: CLINIC | Age: 60
End: 2025-06-02
Payer: COMMERCIAL

## 2025-06-02 DIAGNOSIS — N81.6 RECTOCELE: Primary | ICD-10-CM

## 2025-06-02 PROCEDURE — 99212 OFFICE O/P EST SF 10 MIN: CPT | Performed by: OBSTETRICS & GYNECOLOGY

## 2025-06-02 ASSESSMENT — ENCOUNTER SYMPTOMS
CARDIOVASCULAR NEGATIVE: 1
PSYCHIATRIC NEGATIVE: 1
RESPIRATORY NEGATIVE: 1
CONSTITUTIONAL NEGATIVE: 1
EYES NEGATIVE: 1
NEUROLOGICAL NEGATIVE: 1
ENDOCRINE NEGATIVE: 1
GASTROINTESTINAL NEGATIVE: 1
MUSCULOSKELETAL NEGATIVE: 1

## 2025-06-25 NOTE — PREPROCEDURE INSTRUCTIONS
Pre-Op Instructions &?Checklist       Your surgery has been scheduled at [facility] at [address] [place], [address], [address], in the [facility]. Parking is to the left of the main entrance.      You will be contacted about the time of your surgery the day before your surgery (if your surgery is on a Monday, you will be called the Friday before surgery). If you are unable to answer the phone, a detailed voicemail message will be left. Make sure that your voicemail box is not full so a message can be left. If you have not received a call by 3:00 pm you may call [phone] between the hours of 3:00 and 4:00 pm. Please be available by phone the night before/day of surgery in case there is a change in the schedule which may require you to arrive earlier/later.      ?      ?7 DAYS BEFORE SURGERY STOP THESE MEDICATIONS:       * Multiple Vitamins containing Vitamin E       * Herbal supplements, Fish Oil, garlic pills, turmeric, CoQ enzyme       *Stop taking aspirin, aspirin-containing products, and NSAID's like Advil, Motrin, Aleve, Ibuprofen, Meloxicam, Celecoxib, and Diclofenac. Tylenol is okay to take for pain relief.        *If you are currently taking Coumadin/Warfarin, we will have to coordinate that with your PCP &/or the Anticoagulation Clinic.      THE EVENING BEFORE SURGERY:   Do not eat any food after midnight the night before surgery.    You are permitted to have no more than 4 ounces of clear liquid up to 3 hours before your arrival time.   Clear liquids are liquids you see through such as: water, pulp-free juices like apple or white grape juice, coffee and tea without creamer or milk (sugar is okay); clear soda and sports drinks.        DAY OF SURGERY TAKE THESE MEDICATIONS (if it is not listed, do not take it.)    There are no medications for you to take on the morning of surgery.     ON THE MORNING OF SURGERY:       *Shower either the night before your surgery or the  morning of your surgery       *Do not use moisturizers, creams, lotions or perfume, or make-up.       *Wear comfortable, loose fitting clothing.        *All jewelry and valuables should be left at home.       *Prosthetic devices such as contact lenses, hearing aids, dentures, eyelash extensions, hairpins and body piercings must be removed before surgery. Bring containers for eyeglasses/contacts, dentures, or         hearing aids with you.      ? Diabetics: Please check fasting blood sugars upon waking up. ?If fasting blood sugars are <80ml/dl, please drink 100ml/3oz. of apple juice no later than 2 hours prior to surgery.      ?BRING WITH YOU:        *Photo ID and insurance card       *Current list of medicines and allergies       *Pacemaker/Defibrillator/Heart stent cards       *Copy of your complete Advanced Directive/DHPOA-if applicable      ?SMOKING:       *Quitting smoking can make a huge difference to your health and recovery from surgery. ?       *If you need help with quitting, call [phone].        ALCOHOL:       *No alcoholic beverages for 48 hours before surgery.      ?AFTER OUTPATIENT SURGERY:       *A responsible adult MUST accompany you at the time of discharge and stay with you for 24 hours after your surgery.       *You may NOT drive yourself home after surgery.       *You may use a taxi or ride sharing service ([employer], [employer]) to return home ONLY if you are accompanied by a friend or family member       *Instructions for resuming your medications will be provided by your surgeon.      CONTACT SURGEON'S OFFICE IF YOU DEVELOP:       *Fever =/>?100.4 F        *New respiratory symptoms (e.g. cough, shortness of breath, respiratory distress, sore throat)       *Recent loss of taste or smell       *Flu like symptoms such as headache, fatigue or gastrointestinal symptoms       *If you develop any open sores, shingles, burning or painful urination    AND/OR:       *You no longer wish to have the surgery.        *Any other personal circumstances change that may lead to the need to cancel or defer this surgery.       *You were admitted to any hospital within one week of your planned procedure.      ?If you have any questions regarding these preoperative instructions, you may call [phone]. If you have questions regarding you surgical procedure, or post-operative care/recovery please call your surgeon's office.      Link to [facility] Services Locations   [email or web address]      Link to [facility]   [email or web address]\

## 2025-06-25 NOTE — CPM/PAT H&P
CPM/PAT Evaluation       Name: Gracie Landaverde   /Age: 1965       TELEMEDICINE ENCOUNTER  Patient was interviewed by telephone for preadmission testing perioperative risk assessment prior to surgery.    DATE OF CONSULT: 2025  REFERRING PROVIDER: Dr. Melba Simpson  SURGERY, DATE, AND LENGTH: Posterior colporrhaphy; 2025; 65 minutes    CHIEF COMPLAINT  Rectocele    HPI  Gracie Landaverde is a 60-year-old female with a stage III rectocele.  She has been referred to preadmission testing in anticipation of a posterior colporrhaphy.  Patient with medical history significant for hyperlipidemia; complete left bundle branch block; diverticulosis.  Patient has no other medical complaints at this time, and reports to be in usual state of health without any current or recent illnesses/infections/fevers, or hospitalizations. In the past month, neither the patient nor anyone in the household has had any respiratory illnesses including Covid 19, Influenza; Respiratory Syncytial Virus (RSV), or pneumonia.    ACTIVE PROBLEMS  Problem List[1]     PAST MEDICAL HISTORY  Medical History[2]     SURGICAL HISTORY  Surgical History[3]     ANESTHESIA HISTORY  History of PONV.  Denies other problems with anesthesia in the past such as prolonged sedation, awareness, dental damage, aspiration, cardiac arrest, difficult intubation, or unexpected hospital admissions. Denies family history of malignant hyperthermia, or pseudocholinesterase deficiency.    SOCIAL HISTORY  Never smoker; rare alcohol use; no recreational drug use.  Patient states she walks her dogs 3 miles a day and is able to do moderate ADLs such as heavy housework, light yard work.  She denies chest pain or excessive/limiting shortness of breath.  METS >6    FAMILY HISTORY  Family History[4]     ALLERGIES  RX Allergies[5]     MEDICATIONS  Current Medications[6]     REVIEW OF SYSTEMS  Review of Systems   Genitourinary:         Rectocele   All other  "systems reviewed and are negative.    STOP BANG:  Negative for FEROZ    PHYSICAL EXAM  Deferred    AIRWAY EXAM  Deferred    VITALS  No vitals taken for telemedicine visit  BMI Readings from Last 1 Encounters:   04/28/25 30.13 kg/m²      BP Readings from Last 4 Encounters:   04/28/25 138/84   04/22/24 149/85   09/22/23 130/70   04/10/23 (!) 159/100        LABS  Lab Results   Component Value Date    WBC 7.4 09/23/2023    HGB 14.3 09/23/2023    HCT 44.6 09/23/2023    MCV 90 09/23/2023     09/23/2023      Lab Results   Component Value Date    GLUCOSE 94 09/23/2023    CALCIUM 9.5 09/23/2023     09/23/2023    K 4.5 09/23/2023    CO2 27 09/23/2023     09/23/2023    BUN 21 09/23/2023    CREATININE 0.74 09/23/2023      No results found for: \"HGBA1C\"   Lab Results   Component Value Date    CHOL 179 09/23/2023    CHOL 196 09/24/2022    CHOL 197 09/04/2021     Lab Results   Component Value Date    HDL 46.9 09/23/2023    HDL 46.8 09/24/2022    HDL 50.1 09/04/2021     No results found for: \"LDLCALC\"  Lab Results   Component Value Date    TRIG 111 09/23/2023    TRIG 126 09/24/2022    TRIG 124 09/04/2021     Lab order for CBC, BMP placed on 04/28/2025 by Dr. Simpson.  Patient verbally acknowledged lab work is to be completed before surgery.    Transthoracic echocardiogram from 12/19/2022  mpression  Performed by [facility]  CONCLUSIONS:  - Technically difficult exam due to body habitus.  - Exam indication: Abnormal ECG  - The left ventricle is normal in size. Left ventricular systolic function is  normal. EF = 55 ± 5% (visual est.)  - The right ventricle is normal in size. Right ventricular systolic function is  normal.  - There are no significant valvular abnormalities.  - Suboptimal endocardial definition. Probably normal LVEF. Consider rpt limited  study with echo contrast.  - Exam was compared with the prior CC echocardiographic exam performed on  10/29/18. Similar " findings.      Electronically signed by Kurt Odom MD on 2022 at 5:13:10 PM      * * * Final * * *    ECG from 2022  Normal sinus rhythm  Left axis deviation  Complete left bundle branch block  Abnormal ECG      ASSESSMENT/PLAN  Rectocele  Posterior colporrhaphy      Preoperative instructions reviewed in detail with patient during this encounter. A copy of these instructions has been unloaded to [facility] along with a copy sent to either home email address or mailed to home address.  This note was created in part upon personal review of patient's medical records.  Speech recognition transcription software was used in the creation of this note. Despite proofreading, several typographical errors might be present that might affect the meaning of the content.            [1]   Patient Active Problem List  Diagnosis    Eczema    Female genuine stress incontinence    Snoring    Vaginal atrophy    Annual physical exam    Rectocele   [2]   Past Medical History:  Diagnosis Date    Diverticulosis of intestine, part unspecified, without perforation or abscess without bleeding     Diverticulosis    Encounter for screening for malignant neoplasm of vagina     Vaginal Pap smear    Midline cystocele 2023    Other conditions influencing health status     Complete Spontaneous  Without Complications    Personal history of other benign neoplasm     History of uterine leiomyoma    Personal history of other complications of pregnancy, childbirth and the puerperium     History of miscarriage    Personal history of other medical treatment     History of mammogram    PVD (posterior vitreous detachment), right eye 2023    Rectocele, female 2023    Vaginal vault prolapse 2023   [3]   Past Surgical History:  Procedure Laterality Date    APPENDECTOMY  2016    Appendectomy     SECTION, CLASSIC  2014     Section    COLONOSCOPY      HYSTERECTOMY  2015     Hysterectomy    TUBAL LIGATION  01/06/2014    Tubal Ligation   [4] No family history on file.  [5]   Allergies  Allergen Reactions    Weed Pollen Unknown   [6] No current facility-administered medications for this encounter.    Current Outpatient Medications:     estrogens, conjugated, (Premarin) vaginal cream, Insert 0.5 g into the vagina 2 times a week., Disp: 30 g, Rfl: 1    clotrimazole-betamethasone (Lotrisone) cream, Apply 1 Application topically 2 times a day., Disp: 45 g, Rfl: 1

## 2025-06-26 ENCOUNTER — LAB (OUTPATIENT)
Dept: LAB | Facility: HOSPITAL | Age: 60
End: 2025-06-26
Payer: COMMERCIAL

## 2025-06-26 DIAGNOSIS — N81.6 RECTOCELE: Primary | ICD-10-CM

## 2025-06-26 LAB
ANION GAP SERPL CALC-SCNC: 13 MMOL/L (ref 10–20)
BUN SERPL-MCNC: 26 MG/DL (ref 6–23)
CALCIUM SERPL-MCNC: 9.7 MG/DL (ref 8.6–10.6)
CHLORIDE SERPL-SCNC: 106 MMOL/L (ref 98–107)
CO2 SERPL-SCNC: 27 MMOL/L (ref 21–32)
CREAT SERPL-MCNC: 0.78 MG/DL (ref 0.5–1.05)
EGFRCR SERPLBLD CKD-EPI 2021: 87 ML/MIN/1.73M*2
ERYTHROCYTE [DISTWIDTH] IN BLOOD BY AUTOMATED COUNT: 13.3 % (ref 11.5–14.5)
GLUCOSE SERPL-MCNC: 87 MG/DL (ref 74–99)
HCT VFR BLD AUTO: 45.5 % (ref 36–46)
HGB BLD-MCNC: 13.9 G/DL (ref 12–16)
MCH RBC QN AUTO: 29 PG (ref 26–34)
MCHC RBC AUTO-ENTMCNC: 30.5 G/DL (ref 32–36)
MCV RBC AUTO: 95 FL (ref 80–100)
NRBC BLD-RTO: 0 /100 WBCS (ref 0–0)
PLATELET # BLD AUTO: 363 X10*3/UL (ref 150–450)
POTASSIUM SERPL-SCNC: 4.3 MMOL/L (ref 3.5–5.3)
RBC # BLD AUTO: 4.79 X10*6/UL (ref 4–5.2)
SODIUM SERPL-SCNC: 142 MMOL/L (ref 136–145)
WBC # BLD AUTO: 6.7 X10*3/UL (ref 4.4–11.3)

## 2025-06-26 PROCEDURE — 36415 COLL VENOUS BLD VENIPUNCTURE: CPT

## 2025-06-26 PROCEDURE — 85027 COMPLETE CBC AUTOMATED: CPT

## 2025-06-26 PROCEDURE — 80048 BASIC METABOLIC PNL TOTAL CA: CPT

## 2025-07-07 NOTE — H&P
History Of Present Illness  Gracie Landaverde is a 60 y.o. female presenting with rectocele.     Past Medical History  Medical History[1]    Surgical History  Surgical History[2]     Social History  She reports that she has never smoked. She has never been exposed to tobacco smoke. She has never used smokeless tobacco. She reports that she does not currently use alcohol. She reports that she does not currently use drugs.    Family History  Family History[3]     Allergies  Weed pollen    Review of Systems   All (-)  Physical Exam  RRR CTA B +BS   Last Recorded Vitals  There were no vitals taken for this visit.    Relevant Results      Assessment & Plan  Rectocele    Plan: Posterior repair with OASIS graft, perineorrhaphy, and cystoscopy     Melba Simpson MD         [1]   Past Medical History:  Diagnosis Date    Diverticulosis of intestine, part unspecified, without perforation or abscess without bleeding     Diverticulosis    Encounter for screening for malignant neoplasm of vagina     Vaginal Pap smear    Midline cystocele 2023    Other conditions influencing health status     Complete Spontaneous  Without Complications    Personal history of other benign neoplasm     History of uterine leiomyoma    Personal history of other complications of pregnancy, childbirth and the puerperium     History of miscarriage    Personal history of other medical treatment     History of mammogram    PVD (posterior vitreous detachment), right eye 2023    Rectocele, female 2023    Vaginal vault prolapse 2023   [2]   Past Surgical History:  Procedure Laterality Date    APPENDECTOMY  2016    Appendectomy     SECTION, CLASSIC  2014     Section    COLONOSCOPY      HYSTERECTOMY  2015    Hysterectomy    TUBAL LIGATION  2014    Tubal Ligation   [3] No family history on file.

## 2025-07-08 ENCOUNTER — ANESTHESIA EVENT (OUTPATIENT)
Dept: OPERATING ROOM | Facility: CLINIC | Age: 60
End: 2025-07-08
Payer: COMMERCIAL

## 2025-07-09 ENCOUNTER — ANESTHESIA (OUTPATIENT)
Dept: OPERATING ROOM | Facility: CLINIC | Age: 60
End: 2025-07-09
Payer: COMMERCIAL

## 2025-07-09 ENCOUNTER — SURGERY (OUTPATIENT)
Age: 60
End: 2025-07-09
Payer: COMMERCIAL

## 2025-07-09 ENCOUNTER — HOSPITAL ENCOUNTER (OUTPATIENT)
Facility: CLINIC | Age: 60
Setting detail: OUTPATIENT SURGERY
Discharge: HOME | End: 2025-07-09
Attending: OBSTETRICS & GYNECOLOGY | Admitting: OBSTETRICS & GYNECOLOGY
Payer: COMMERCIAL

## 2025-07-09 VITALS
HEIGHT: 67 IN | WEIGHT: 192.46 LBS | SYSTOLIC BLOOD PRESSURE: 131 MMHG | HEART RATE: 67 BPM | BODY MASS INDEX: 30.21 KG/M2 | DIASTOLIC BLOOD PRESSURE: 83 MMHG | OXYGEN SATURATION: 99 % | RESPIRATION RATE: 16 BRPM | TEMPERATURE: 98.2 F

## 2025-07-09 DIAGNOSIS — N81.6 RECTOCELE: ICD-10-CM

## 2025-07-09 DIAGNOSIS — G89.18 POSTOPERATIVE PAIN: Primary | ICD-10-CM

## 2025-07-09 PROBLEM — R94.31 ABNORMAL EKG: Status: ACTIVE | Noted: 2025-07-09

## 2025-07-09 PROCEDURE — 2500000001 HC RX 250 WO HCPCS SELF ADMINISTERED DRUGS (ALT 637 FOR MEDICARE OP): Performed by: OBSTETRICS & GYNECOLOGY

## 2025-07-09 PROCEDURE — 7100000009 HC PHASE TWO TIME - INITIAL BASE CHARGE: Performed by: OBSTETRICS & GYNECOLOGY

## 2025-07-09 PROCEDURE — A57250 PR POST COLPORRHAPHY,RECTUM/VAGINA: Performed by: ANESTHESIOLOGY

## 2025-07-09 PROCEDURE — 3600000003 HC OR TIME - INITIAL BASE CHARGE - PROCEDURE LEVEL THREE: Performed by: OBSTETRICS & GYNECOLOGY

## 2025-07-09 PROCEDURE — 88305 TISSUE EXAM BY PATHOLOGIST: CPT | Mod: TC,SUR | Performed by: OBSTETRICS & GYNECOLOGY

## 2025-07-09 PROCEDURE — 2720000007 HC OR 272 NO HCPCS: Performed by: OBSTETRICS & GYNECOLOGY

## 2025-07-09 PROCEDURE — 57250 REPAIR RECTUM & VAGINA: CPT | Performed by: OBSTETRICS & GYNECOLOGY

## 2025-07-09 PROCEDURE — 3700000002 HC GENERAL ANESTHESIA TIME - EACH INCREMENTAL 1 MINUTE: Performed by: OBSTETRICS & GYNECOLOGY

## 2025-07-09 PROCEDURE — 3700000001 HC GENERAL ANESTHESIA TIME - INITIAL BASE CHARGE: Performed by: OBSTETRICS & GYNECOLOGY

## 2025-07-09 PROCEDURE — 57267 INSERT MESH/PELVIC FLR ADDON: CPT | Performed by: OBSTETRICS & GYNECOLOGY

## 2025-07-09 PROCEDURE — 2500000004 HC RX 250 GENERAL PHARMACY W/ HCPCS (ALT 636 FOR OP/ED): Performed by: ANESTHESIOLOGY

## 2025-07-09 PROCEDURE — 7100000010 HC PHASE TWO TIME - EACH INCREMENTAL 1 MINUTE: Performed by: OBSTETRICS & GYNECOLOGY

## 2025-07-09 PROCEDURE — 2500000004 HC RX 250 GENERAL PHARMACY W/ HCPCS (ALT 636 FOR OP/ED): Performed by: OBSTETRICS & GYNECOLOGY

## 2025-07-09 PROCEDURE — 88305 TISSUE EXAM BY PATHOLOGIST: CPT | Performed by: STUDENT IN AN ORGANIZED HEALTH CARE EDUCATION/TRAINING PROGRAM

## 2025-07-09 PROCEDURE — A57250 PR POST COLPORRHAPHY,RECTUM/VAGINA: Performed by: NURSE ANESTHETIST, CERTIFIED REGISTERED

## 2025-07-09 PROCEDURE — 2780000003 HC OR 278 NO HCPCS: Performed by: OBSTETRICS & GYNECOLOGY

## 2025-07-09 PROCEDURE — 51702 INSERT TEMP BLADDER CATH: CPT

## 2025-07-09 PROCEDURE — 2500000005 HC RX 250 GENERAL PHARMACY W/O HCPCS: Performed by: OBSTETRICS & GYNECOLOGY

## 2025-07-09 PROCEDURE — 2500000004 HC RX 250 GENERAL PHARMACY W/ HCPCS (ALT 636 FOR OP/ED): Mod: JZ | Performed by: ANESTHESIOLOGY

## 2025-07-09 PROCEDURE — 3600000008 HC OR TIME - EACH INCREMENTAL 1 MINUTE - PROCEDURE LEVEL THREE: Performed by: OBSTETRICS & GYNECOLOGY

## 2025-07-09 DEVICE — OASIS ULTRA TRI-LAYER MATRIX
Type: IMPLANTABLE DEVICE | Site: VAGINA | Status: FUNCTIONAL
Brand: OASIS

## 2025-07-09 RX ORDER — KETOROLAC TROMETHAMINE 30 MG/ML
INJECTION, SOLUTION INTRAMUSCULAR; INTRAVENOUS AS NEEDED
Status: DISCONTINUED | OUTPATIENT
Start: 2025-07-09 | End: 2025-07-09

## 2025-07-09 RX ORDER — LIDOCAINE HYDROCHLORIDE 10 MG/ML
0.1 INJECTION, SOLUTION EPIDURAL; INFILTRATION; INTRACAUDAL; PERINEURAL ONCE
Status: DISCONTINUED | OUTPATIENT
Start: 2025-07-09 | End: 2025-07-09 | Stop reason: HOSPADM

## 2025-07-09 RX ORDER — ONDANSETRON HYDROCHLORIDE 2 MG/ML
4 INJECTION, SOLUTION INTRAVENOUS ONCE AS NEEDED
Status: DISCONTINUED | OUTPATIENT
Start: 2025-07-09 | End: 2025-07-09 | Stop reason: HOSPADM

## 2025-07-09 RX ORDER — OXYCODONE HYDROCHLORIDE 5 MG/1
5 TABLET ORAL EVERY 4 HOURS PRN
Status: DISCONTINUED | OUTPATIENT
Start: 2025-07-09 | End: 2025-07-09 | Stop reason: HOSPADM

## 2025-07-09 RX ORDER — LIDOCAINE HYDROCHLORIDE 10 MG/ML
INJECTION, SOLUTION INFILTRATION; PERINEURAL AS NEEDED
Status: DISCONTINUED | OUTPATIENT
Start: 2025-07-09 | End: 2025-07-09 | Stop reason: HOSPADM

## 2025-07-09 RX ORDER — LIDOCAINE HCL/PF 100 MG/5ML
SYRINGE (ML) INTRAVENOUS AS NEEDED
Status: DISCONTINUED | OUTPATIENT
Start: 2025-07-09 | End: 2025-07-09

## 2025-07-09 RX ORDER — IBUPROFEN 800 MG/1
800 TABLET, FILM COATED ORAL 3 TIMES DAILY PRN
Qty: 20 TABLET | Refills: 1 | Status: SHIPPED | OUTPATIENT
Start: 2025-07-09

## 2025-07-09 RX ORDER — FENTANYL CITRATE 50 UG/ML
50 INJECTION, SOLUTION INTRAMUSCULAR; INTRAVENOUS EVERY 5 MIN PRN
Status: DISCONTINUED | OUTPATIENT
Start: 2025-07-09 | End: 2025-07-09 | Stop reason: HOSPADM

## 2025-07-09 RX ORDER — ACETAMINOPHEN 325 MG/1
975 TABLET ORAL ONCE
Status: COMPLETED | OUTPATIENT
Start: 2025-07-09 | End: 2025-07-09

## 2025-07-09 RX ORDER — CEFAZOLIN 1 G/1
INJECTION, POWDER, FOR SOLUTION INTRAVENOUS AS NEEDED
Status: DISCONTINUED | OUTPATIENT
Start: 2025-07-09 | End: 2025-07-09

## 2025-07-09 RX ORDER — ACETAMINOPHEN 325 MG/1
650 TABLET ORAL EVERY 4 HOURS PRN
Status: DISCONTINUED | OUTPATIENT
Start: 2025-07-09 | End: 2025-07-09 | Stop reason: HOSPADM

## 2025-07-09 RX ORDER — SILVER NITRATE 38.21; 12.74 MG/1; MG/1
STICK TOPICAL AS NEEDED
Status: DISCONTINUED | OUTPATIENT
Start: 2025-07-09 | End: 2025-07-09 | Stop reason: HOSPADM

## 2025-07-09 RX ORDER — CELECOXIB 200 MG/1
400 CAPSULE ORAL ONCE
Status: DISCONTINUED | OUTPATIENT
Start: 2025-07-09 | End: 2025-07-09 | Stop reason: HOSPADM

## 2025-07-09 RX ORDER — PHENAZOPYRIDINE HYDROCHLORIDE 100 MG/1
200 TABLET, FILM COATED ORAL ONCE
Status: DISCONTINUED | OUTPATIENT
Start: 2025-07-09 | End: 2025-07-09 | Stop reason: HOSPADM

## 2025-07-09 RX ORDER — MIDAZOLAM HYDROCHLORIDE 1 MG/ML
INJECTION, SOLUTION INTRAMUSCULAR; INTRAVENOUS AS NEEDED
Status: DISCONTINUED | OUTPATIENT
Start: 2025-07-09 | End: 2025-07-09

## 2025-07-09 RX ORDER — SODIUM CHLORIDE 0.9 G/100ML
INJECTION, SOLUTION IRRIGATION AS NEEDED
Status: DISCONTINUED | OUTPATIENT
Start: 2025-07-09 | End: 2025-07-09 | Stop reason: HOSPADM

## 2025-07-09 RX ORDER — ONDANSETRON HYDROCHLORIDE 2 MG/ML
INJECTION, SOLUTION INTRAVENOUS AS NEEDED
Status: DISCONTINUED | OUTPATIENT
Start: 2025-07-09 | End: 2025-07-09

## 2025-07-09 RX ORDER — SODIUM CHLORIDE, SODIUM LACTATE, POTASSIUM CHLORIDE, CALCIUM CHLORIDE 600; 310; 30; 20 MG/100ML; MG/100ML; MG/100ML; MG/100ML
100 INJECTION, SOLUTION INTRAVENOUS CONTINUOUS
Status: SHIPPED | OUTPATIENT
Start: 2025-07-09 | End: 2025-07-09

## 2025-07-09 RX ORDER — OXYCODONE HYDROCHLORIDE 5 MG/1
5 TABLET ORAL EVERY 6 HOURS PRN
Qty: 12 TABLET | Refills: 0 | Status: SHIPPED | OUTPATIENT
Start: 2025-07-09 | End: 2025-07-21 | Stop reason: ALTCHOICE

## 2025-07-09 RX ORDER — FENTANYL CITRATE 50 UG/ML
INJECTION, SOLUTION INTRAMUSCULAR; INTRAVENOUS AS NEEDED
Status: DISCONTINUED | OUTPATIENT
Start: 2025-07-09 | End: 2025-07-09

## 2025-07-09 RX ORDER — PROPOFOL 10 MG/ML
INJECTION, EMULSION INTRAVENOUS CONTINUOUS PRN
Status: DISCONTINUED | OUTPATIENT
Start: 2025-07-09 | End: 2025-07-09

## 2025-07-09 RX ORDER — GABAPENTIN 600 MG/1
600 TABLET ORAL ONCE
Status: COMPLETED | OUTPATIENT
Start: 2025-07-09 | End: 2025-07-09

## 2025-07-09 RX ADMIN — FENTANYL CITRATE 50 MCG: 50 INJECTION, SOLUTION INTRAMUSCULAR; INTRAVENOUS at 10:33

## 2025-07-09 RX ADMIN — FENTANYL CITRATE 50 MCG: 50 INJECTION, SOLUTION INTRAMUSCULAR; INTRAVENOUS at 10:27

## 2025-07-09 RX ADMIN — SODIUM CHLORIDE 100 ML: 0.9 IRRIGANT IRRIGATION at 09:03

## 2025-07-09 RX ADMIN — KETOROLAC TROMETHAMINE 30 MG: 30 INJECTION, SOLUTION INTRAMUSCULAR; INTRAVENOUS at 09:56

## 2025-07-09 RX ADMIN — ACETAMINOPHEN 975 MG: 325 TABLET, FILM COATED ORAL at 08:04

## 2025-07-09 RX ADMIN — MIDAZOLAM 2 MG: 1 INJECTION INTRAMUSCULAR; INTRAVENOUS at 08:53

## 2025-07-09 RX ADMIN — PROPOFOL 50 MG: 10 INJECTION, EMULSION INTRAVENOUS at 08:57

## 2025-07-09 RX ADMIN — ONDANSETRON 4 MG: 2 INJECTION, SOLUTION INTRAMUSCULAR; INTRAVENOUS at 09:06

## 2025-07-09 RX ADMIN — FENTANYL CITRATE 50 MCG: 50 INJECTION, SOLUTION INTRAMUSCULAR; INTRAVENOUS at 09:06

## 2025-07-09 RX ADMIN — SODIUM CHLORIDE 1000 ML: 0.9 IRRIGANT IRRIGATION at 09:10

## 2025-07-09 RX ADMIN — SILVER NITRATE APPLICATORS 2 APPLICATION: 25; 75 STICK TOPICAL at 10:02

## 2025-07-09 RX ADMIN — FENTANYL CITRATE 50 MCG: 50 INJECTION, SOLUTION INTRAMUSCULAR; INTRAVENOUS at 08:58

## 2025-07-09 RX ADMIN — CEFAZOLIN 2 G: 330 INJECTION, POWDER, FOR SOLUTION INTRAMUSCULAR; INTRAVENOUS at 08:58

## 2025-07-09 RX ADMIN — PROPOFOL 140 MCG/KG/MIN: 10 INJECTION, EMULSION INTRAVENOUS at 08:58

## 2025-07-09 RX ADMIN — SODIUM CHLORIDE: 0.9 INJECTION, SOLUTION INTRAVENOUS at 08:53

## 2025-07-09 RX ADMIN — FENTANYL CITRATE 50 MCG: 50 INJECTION, SOLUTION INTRAMUSCULAR; INTRAVENOUS at 10:42

## 2025-07-09 RX ADMIN — GABAPENTIN 600 MG: 600 TABLET, FILM COATED ORAL at 08:04

## 2025-07-09 RX ADMIN — LIDOCAINE HYDROCHLORIDE 3 ML: 10 INJECTION, SOLUTION INFILTRATION; PERINEURAL at 09:10

## 2025-07-09 RX ADMIN — LIDOCAINE HYDROCHLORIDE 60 MG: 20 INJECTION INTRAVENOUS at 09:00

## 2025-07-09 ASSESSMENT — PAIN DESCRIPTION - DESCRIPTORS
DESCRIPTORS: BURNING

## 2025-07-09 ASSESSMENT — PAIN SCALES - GENERAL
PAINLEVEL_OUTOF10: 4
PAINLEVEL_OUTOF10: 0 - NO PAIN
PAINLEVEL_OUTOF10: 5 - MODERATE PAIN
PAINLEVEL_OUTOF10: 10 - WORST POSSIBLE PAIN
PAINLEVEL_OUTOF10: 0 - NO PAIN
PAINLEVEL_OUTOF10: 5 - MODERATE PAIN
PAINLEVEL_OUTOF10: 8
PAINLEVEL_OUTOF10: 6
PAINLEVEL_OUTOF10: 5 - MODERATE PAIN
PAINLEVEL_OUTOF10: 10 - WORST POSSIBLE PAIN
PAINLEVEL_OUTOF10: 5 - MODERATE PAIN
PAINLEVEL_OUTOF10: 10 - WORST POSSIBLE PAIN

## 2025-07-09 ASSESSMENT — PAIN DESCRIPTION - LOCATION
LOCATION: VAGINA

## 2025-07-09 ASSESSMENT — PAIN - FUNCTIONAL ASSESSMENT
PAIN_FUNCTIONAL_ASSESSMENT: 0-10

## 2025-07-09 ASSESSMENT — COLUMBIA-SUICIDE SEVERITY RATING SCALE - C-SSRS
2. HAVE YOU ACTUALLY HAD ANY THOUGHTS OF KILLING YOURSELF?: NO
6. HAVE YOU EVER DONE ANYTHING, STARTED TO DO ANYTHING, OR PREPARED TO DO ANYTHING TO END YOUR LIFE?: NO
1. IN THE PAST MONTH, HAVE YOU WISHED YOU WERE DEAD OR WISHED YOU COULD GO TO SLEEP AND NOT WAKE UP?: NO

## 2025-07-09 NOTE — DISCHARGE INSTRUCTIONS
Call Dr. Simpson for any problems and/or concerns at [phone]    *Walk as much as possible, it is ok to use stairs carefully  *Continue the coughing and deep breathing exercises that your learned in the hospital  *No lifting/straining greater than 10 pounds for 6 weeks (Avoid strenuous activity)  *Vaginal rest for 6 weeks (Do not put anything in your vagina. Do not use tampons or douches. Do not have sex until cleared by physician. If you were prescribed vaginal estrogen cream please use as directed)  *Prevent constipation by using stool softeners and staying hydrated, so that you do not strain against your stitches or have pain from constipation. Use miralax 1 capful nightly to help  prevent constipation.    Call Doctor right away for:    *Fever above 100.4/shaking chills  *Bright red vaginal bleeding or bleeding that soaks more than one sanitary pad per hour  *A foul smelling discharge from the vagina  *Trouble urinating or burning with urination  *Severe pain or bloating in your abdomen  *Persistent nausea/vomiting  *Redness, swelling, or drainage at your incision sites  *Chest pain/shortness of breath-call [phone].    - You will be going home with prescriptions for pain medication. If you are able to take them, alternate a dose of Acetaminophen (Tylenol) and Ibuprofen (Motrin) every 3 hours. (For example, 1000mg of Tylenol at 09:00am, 600mg ibuprofen at 12:00pm, 1000mg of Tylenol at 3:00pm, 600mg ibuprofen at 6:00pm). Use any prescribed narcotic (ie oxycodone) for breakthrough pain as prescribed. Use a stool softener, such as Miralax, to prevent constipation from the narcotic medication. If you are going home with a prescription for estrogen cream, use vaginally as prescribed nightly until your 6 week post-op visit.

## 2025-07-09 NOTE — ANESTHESIA PREPROCEDURE EVALUATION
Patient: Gracie Landaverde    Procedure Information       Date/Time: 07/09/25 0845    Procedure: COLPORRHAPHY, POSTERIOR    Location: [facility] OR 02 / Virtual [facility]    Surgeons: Melba Simpson MD          There were no vitals filed for this visit.    Surgical History[1]  Medical History[2]  Current Medications[3]  Prior to Admission medications    Medication Sig Start Date End Date Taking? Authorizing Provider   estrogens, conjugated, (Premarin) vaginal cream Insert 0.5 g into the vagina 2 times a week. 3/31/25  Yes Melba Simpson MD   clotrimazole-betamethasone (Lotrisone) cream Apply 1 Application topically 2 times a day. 3/29/25   Melba Simpson MD     RX Allergies[4]  Social History     Tobacco Use    Smoking status: Never     Passive exposure: Never    Smokeless tobacco: Never   Substance Use Topics    Alcohol use: Not Currently     Comment: rare         Chemistry    Lab Results   Component Value Date/Time     06/26/2025 1021    K 4.3 06/26/2025 1021     06/26/2025 1021    CO2 27 06/26/2025 1021    BUN 26 (H) 06/26/2025 1021    CREATININE 0.78 06/26/2025 1021    Lab Results   Component Value Date/Time    CALCIUM 9.7 06/26/2025 1021    ALKPHOS 97 09/23/2023 0954    AST 17 09/23/2023 0954    ALT 15 09/23/2023 0954    BILITOT 0.5 09/23/2023 0954          Lab Results   Component Value Date/Time    WBC 6.7 06/26/2025 1021    HGB 13.9 06/26/2025 1021    HCT 45.5 06/26/2025 1021     06/26/2025 1021     Lab Results   Component Value Date/Time    PROTIME 12.6 11/29/2018 1300    INR 1.1 11/29/2018 1300     No results found for this or any previous visit (from the past 4464 hours).  Relevant Problems   Anesthesia (within normal limits)      Cardiac  LBBB   (+) Abnormal EKG      Pulmonary (within normal limits)      Skin   (+) Eczema       Clinical information reviewed:   Tobacco  Allergies  Meds  Problems  Med Hx  Surg Hx   Fam Hx          NPO Detail:  No data recorded      Physical Exam    Airway  Mallampati: III  TM distance: >3 FB  Neck ROM: full  Mouth openin finger widths     Cardiovascular    Dental - normal exam     Pulmonary    Abdominal            Anesthesia Plan    History of general anesthesia?: yes  History of complications of general anesthesia?: no    ASA 2     MAC     intravenous induction   Anesthetic plan and risks discussed with patient.             [1]   Past Surgical History:  Procedure Laterality Date    APPENDECTOMY  2016    Appendectomy     SECTION, CLASSIC  2014     Section    COLONOSCOPY      HYSTERECTOMY  2015    Hysterectomy    TUBAL LIGATION  2014    Tubal Ligation   [2]   Past Medical History:  Diagnosis Date    Diverticulosis of intestine, part unspecified, without perforation or abscess without bleeding     Diverticulosis    Encounter for screening for malignant neoplasm of vagina     Vaginal Pap smear    Midline cystocele 2023    Other conditions influencing health status     Complete Spontaneous  Without Complications    Personal history of other benign neoplasm     History of uterine leiomyoma    Personal history of other complications of pregnancy, childbirth and the puerperium     History of miscarriage    Personal history of other medical treatment     History of mammogram    PVD (posterior vitreous detachment), right eye 2023    Rectocele, female 2023    Vaginal vault prolapse 2023   [3] No current facility-administered medications for this encounter.  [4]   Allergies  Allergen Reactions    Weed Pollen Unknown

## 2025-07-09 NOTE — ANESTHESIA POSTPROCEDURE EVALUATION
Patient: Gracie Landaverde    Procedure Summary       Date: 07/09/25 [facility]    Anesthesia Start: 0853 Anesthesia Stop: 1012    Procedure: COLPORRHAPHY with graft, POSTERIOR cystoscopy (Vagina ) Diagnosis:       Rectocele      (Rectocele [N81.6])    Surgeons: Melba Simpson MD Responsible Provider: Clary Baldwin MD    Anesthesia Type: MAC ASA Status: 2            Anesthesia Type: MAC    Vitals Value Taken Time   /60 07/09/25 10:09   Temp 36 °C (96.8 °F) 07/09/25 10:09   Pulse 65 07/09/25 10:09   Resp 16 07/09/25 10:09   SpO2 96 % 07/09/25 10:09       Anesthesia Post Evaluation    Patient location during evaluation: PACU  Patient participation: complete - patient participated  Level of consciousness: awake  Pain management: adequate  Airway patency: patent  Cardiovascular status: acceptable  Respiratory status: acceptable  Hydration status: acceptable  Postoperative Nausea and Vomiting: none        There were no known notable events for this encounter.

## 2025-07-09 NOTE — OP NOTE
COLPORRHAPHY with graft, POSTERIOR cystoscopy Operative Note     Date: 2025  [place] [facility] [place] [facility] [place]    Name: Gracie Landaverde, : 1965, Age: 60 y.o., MRN: [id number], Sex: female    Diagnosis  Pre-op Diagnosis      * Rectocele [N81.6] Post-op Diagnosis     * Rectocele [N81.6]     Procedures  COLPORRHAPHY with graft, POSTERIOR cystoscopy  34065 - NV POST COLPORRHAPHY RECTOCELE W/WO PERINEORRHAPHY  cystoscopy    Surgeons      * Melba Simpson - Primary    Resident/Fellow/Other Assistant:  Surgeons and Role:  * No surgeons found with a matching role *  VIKA Garcia MS 3  Staff:   Circulator: Lisa Walters Person: Wing    Anesthesia Staff: Anesthesiologist: Clary Baldwin MD  CRNA: PARRISH Helton-CRNA  SRNA: Mary Sharp RN    Procedure Summary  Anesthesia: Monitor Anesthesia Care  ASA: II  Estimated Blood Loss: 30 mL  Intra-op Medications:   Administrations occurring from 0845 to 1000 on 25:   Medication Name Total Dose   sodium chloride 0.9 % irrigation solution 1,100 mL   lidocaine (Xylocaine) 10 mg/mL (1 %) injection 3 mL   ceFAZolin (Ancef) 1 gram/ 3 mL injection - reconstituted 330 mg/mL 2 g   fentaNYL (Sublimaze) injection 50 mcg/mL 100 mcg   ketorolac (Toradol) injection 30 mg 30 mg   lidocaine (cardiac) injection 2% prefilled syringe 60 mg   midazolam (Versed) injection 1 mg/mL 2 mg   ondansetron (Zofran) 2 mg/mL injection 4 mg   propofol (Diprivan) injection 10 mg/mL 596.06 mg   NaCl 0.9 % bolus Cannot be calculated              Anesthesia Record               Intraprocedure I/O Totals          Intake    NaCl 0.9 % bolus 500.00 mL    Total Intake 500 mL          Specimen:   ID Type Source Tests Collected by Time   1 : vaginal mucosa Tissue VAGINAL [facility] SURGICAL PATHOLOGY EXAM Melba Simpson MD 2025 0940                 Drains and/or Catheters:   Urethral Catheter (Active)       Tourniquet Times:         Implants:  Implants       Type Name Action Serial No.       Implant DRESSING, WOUND, OASIS ULTRA, TRI-LAYER MATRIX, 3 X 3.5 - [id number] Implanted               Findings: bilateral normal ureteral efflux, normal rectal exam    Indications: Gracie Landaverde is an 60 y.o. female who is having surgery for Rectocele [N81.6].     The patient was seen in the preoperative area. The risks, benefits, complications, treatment options, non-operative alternatives, expected recovery and outcomes were discussed with the patient. The possibilities of reaction to medication, pulmonary aspiration, injury to surrounding structures, bleeding, recurrent infection, the need for additional procedures, failure to diagnose a condition, and creating a complication requiring transfusion or operation were discussed with the patient. The patient concurred with the proposed plan, giving informed consent.  The site of surgery was properly noted/marked if necessary per policy. The patient has been actively warmed in preoperative area. Preoperative antibiotics have been ordered and given within 1 hours of incision. Venous thrombosis prophylaxis have been ordered including bilateral sequential compression devices    Procedure Details: Attention was turned to the posterior vagina which was injected with 1% lido with epi and the rectocele site was grasped with 2 allis clamps and injected with 1% lidocaine and an incision made. The incision was carried superiorly past the level of her rectocele and sharp and blunt dissection of the vaginal mucosa off the vaginal muscularis performed.  Good hemostasis was obtained with bovie cautery as needed. 3-0 Vicryl was then used to create a series of figure-of-eight and purse string sutures to reduce the large enterocele. 3-0 vicryl sutures were used to plicate the posterior vaginal wall muscularis in the form of an posterior colporraphy. Once completed, the OASIS graft which had been rehydrated was laid over the repair and secured in all corners and distally and  proximally with 3-0 vicryl. The vaginal mucosa was trimmed and passed off the field as a specimen #1.   Closure of the posterior repair was performed with 3-0 Monocryl in a running locked fashion with good hemostasis.     A rectal exam was performed and was normal.    Cystoscopy was performed and the bladder was noted to be normal with bilateral ureteral efflux.     The patient was cleaned and awoken from anesthesia and taken to the recovery room in good condition.    Evidence of Infection: No   Complications:  None; patient tolerated the procedure well.    Disposition: PACU - hemodynamically stable.  Condition: stable       Additional Details: none    Attending Attestation: A qualified resident physician was not available.    Melba Simpson  Phone Number: [phone]       Detail Level: Detailed Detail Level: Generalized

## 2025-07-20 NOTE — PROGRESS NOTES
Urogynecology  Provider:  Melba Simpson MD  [phone]        ASSESSMENT AND PLAN:   60 y.o. female being assessed s/p 7/9/25 posterior repair with OASIS graft, cysto.     Diagnoses:   #1 Rectocele, postop     Plan:   1. Rectocele, postop    - 7/9/25 posterior repair with OASIS graft, cysto   - Restart tv estrogen cream nightly x2 weeks and then return to 2 nights per week.  - On exam, there is a small area of granulation tissue right at the top of the MD that I cauterized with silver nitrate. Otherwise, her exam was normal.      Follow-up in 1 month with Dr. Simpson.     Scribe Attestation:   I, Patricia Rodrigez, am scribing for virtually, and in the presence of Melba Simpson MD on 07/21/2025 at 5:03 PM.     Agree with above. I Dr. Simpson, personally performed the services described in the documentation which was scribed virtually and confirm it is both complete and accurate.  Melba Simpson MD      Problem List Items Addressed This Visit    None          I spent a total of eConsult Time: 16 minutes in face to face and non face to face time.        Melba Simpson MD        HISTORY OF PRESENT ILLNESS:   Gracie Landaverde is a 60 y.o. female who presents for Post-op Visit (7/9/25 COLPORRHAPHY with graft, POSTERIOR cystoscopy).    7/9/25 posterior repair with OASIS graft, cysto     Postop Symptoms:  - Pain has been minimal. Ibuprofen managed her pain well.    - The patient does not normally use estrogen cream regularly.      Past Medical History:      Medical History[1]       Past Surgical History:       Surgical History[2]      Medications:       Prior to Admission medications   Medication Sig Start Date End Date Taking? Authorizing Provider   clotrimazole-betamethasone (Lotrisone) cream Apply 1 Application topically 2 times a day. 3/29/25   Melba Simpson MD   estrogens, conjugated, (Premarin) vaginal cream Insert 0.5 g into the vagina 2 times a week. 3/31/25   Melba Simpson MD    ibuprofen 800 mg tablet Take 1 tablet (800 mg) by mouth 3 times a day as needed for mild pain (1 - 3) (pain). Take with food 7/9/25   Melba Simpson MD   oxyCODONE (Roxicodone) 5 mg immediate release tablet Take 1 tablet (5 mg) by mouth every 6 hours if needed for severe pain (7 - 10). 7/9/25   Melba Simpson MD        ROS  Review of Systems     Blood, Urine   Date Value Ref Range Status   08/19/2020 NEGATIVE NEGATIVE Final     Nitrite, Urine   Date Value Ref Range Status   08/19/2020 NEGATIVE NEGATIVE Final     Urobilinogen, Urine   Date Value Ref Range Status   08/19/2020 <2.0 0.0 - 1.9 mg/dL Final         PHYSICAL EXAM:      There were no vitals taken for this visit.     No LMP recorded. Patient has had a hysterectomy.      Declines chaperone for physical exam.      Well developed, well nourished, in no apparent distress.   Neurologic/Psychiatric:  Awake, Alert and Oriented times 3.  Affect normal.     GENITAL/URINARY:       External Genitalia:  The patient has normal appearing external genitalia, normal skenes and bartholins glands, and a normal hair distribution.  Her vulva is without lesions, erythema or discharge.  It is non-tender with appropriate sensation.     Urethral Meatus:  Size normal, Location normal, Lesions absent, Prolapse absent,      Urethra:  Fullness absent, Masses absent,      Bladder:  Fullness absent, Masses absent, Tenderness absent,      Vagina:  General appearance normal, Discharge absent, Lesions absent, small area of granulation tissue right at the top of the OH that I cauterized with silver nitrate, the remainder of it is beautifully healed with no significant issues, no abnormalities, verified to be healing well on exam     Cervix: Normal   Uterus:  surgically absent     Anus/Perineum:  Lesions absent and Masses absent defer exam  Normal Perineum          Data and DIAGNOSTIC STUDIES REVIEWED   Imaging  No results found.    Cardiology, Vascular, and Other Imaging  No other  "imaging results found for the past 7 days     No results found for: \"URINECULTURE\", \"[employer]\"   Lab Results   Component Value Date    GLUCOSE 87 2025    CALCIUM 9.7 2025     2025    K 4.3 2025    CO2 27 2025     2025    BUN 26 (H) 2025    CREATININE 0.78 2025     Lab Results   Component Value Date    WBC 6.7 2025    HGB 13.9 2025    HCT 45.5 2025    MCV 95 2025     2025          Melba Simpson MD             [1]   Past Medical History:  Diagnosis Date    Diverticulosis of intestine, part unspecified, without perforation or abscess without bleeding     Diverticulosis    Encounter for screening for malignant neoplasm of vagina     Vaginal Pap smear    Midline cystocele 2023    Other conditions influencing health status     Complete Spontaneous  Without Complications    Personal history of other benign neoplasm     History of uterine leiomyoma    Personal history of other complications of pregnancy, childbirth and the puerperium     History of miscarriage    Personal history of other medical treatment     History of mammogram    PVD (posterior vitreous detachment), right eye 2023    Rectocele, female 2023    Vaginal vault prolapse 2023   [2]   Past Surgical History:  Procedure Laterality Date    APPENDECTOMY  2016    Appendectomy     SECTION, CLASSIC  2014     Section    COLONOSCOPY      HYSTERECTOMY  2015    Hysterectomy    TUBAL LIGATION  2014    Tubal Ligation     "

## 2025-07-21 ENCOUNTER — OFFICE VISIT (OUTPATIENT)
Dept: OBSTETRICS AND GYNECOLOGY | Facility: CLINIC | Age: 60
End: 2025-07-21
Payer: COMMERCIAL

## 2025-07-21 VITALS
DIASTOLIC BLOOD PRESSURE: 93 MMHG | SYSTOLIC BLOOD PRESSURE: 144 MMHG | WEIGHT: 192 LBS | BODY MASS INDEX: 30.13 KG/M2 | HEIGHT: 67 IN

## 2025-07-21 DIAGNOSIS — N39.9 URINARY DISORDER: Primary | ICD-10-CM

## 2025-07-21 LAB
LABORATORY COMMENT REPORT: NORMAL
PATH REPORT.FINAL DX SPEC: NORMAL
PATH REPORT.GROSS SPEC: NORMAL
PATH REPORT.RELEVANT HX SPEC: NORMAL
PATH REPORT.TOTAL CANCER: NORMAL

## 2025-07-21 PROCEDURE — 3008F BODY MASS INDEX DOCD: CPT | Performed by: OBSTETRICS & GYNECOLOGY

## 2025-07-21 PROCEDURE — 99211 OFF/OP EST MAY X REQ PHY/QHP: CPT | Performed by: OBSTETRICS & GYNECOLOGY

## 2025-07-21 RX ORDER — EFINACONAZOLE 100 MG/ML
1 SOLUTION TOPICAL DAILY
COMMUNITY

## 2025-07-21 ASSESSMENT — PAIN SCALES - GENERAL: PAINLEVEL_OUTOF10: 0-NO PAIN

## 2025-08-11 ENCOUNTER — OFFICE VISIT (OUTPATIENT)
Dept: OBSTETRICS AND GYNECOLOGY | Facility: CLINIC | Age: 60
End: 2025-08-11
Payer: COMMERCIAL

## 2025-08-11 VITALS
HEART RATE: 73 BPM | DIASTOLIC BLOOD PRESSURE: 91 MMHG | WEIGHT: 191 LBS | SYSTOLIC BLOOD PRESSURE: 155 MMHG | HEIGHT: 67 IN | BODY MASS INDEX: 29.98 KG/M2

## 2025-08-11 DIAGNOSIS — N39.9 URINARY DISORDER: Primary | ICD-10-CM

## 2025-08-11 DIAGNOSIS — R31.9 HEMATURIA, UNSPECIFIED TYPE: ICD-10-CM

## 2025-08-11 DIAGNOSIS — L90.0 LICHEN SCLEROSUS ET ATROPHICUS: ICD-10-CM

## 2025-08-11 LAB
POC APPEARANCE, URINE: CLEAR
POC BILIRUBIN, URINE: NEGATIVE
POC BLOOD, URINE: ABNORMAL
POC COLOR, URINE: YELLOW
POC GLUCOSE, URINE: NEGATIVE MG/DL
POC KETONES, URINE: NEGATIVE MG/DL
POC LEUKOCYTES, URINE: ABNORMAL
POC NITRITE,URINE: NEGATIVE
POC PH, URINE: 6.5 PH
POC PROTEIN, URINE: ABNORMAL MG/DL
POC SPECIFIC GRAVITY, URINE: 1.02
POC UROBILINOGEN, URINE: 0.2 EU/DL

## 2025-08-11 PROCEDURE — 99211 OFF/OP EST MAY X REQ PHY/QHP: CPT | Performed by: OBSTETRICS & GYNECOLOGY

## 2025-08-11 PROCEDURE — 81003 URINALYSIS AUTO W/O SCOPE: CPT | Mod: QW | Performed by: OBSTETRICS & GYNECOLOGY

## 2025-08-11 PROCEDURE — 3008F BODY MASS INDEX DOCD: CPT | Performed by: OBSTETRICS & GYNECOLOGY

## 2025-08-11 RX ORDER — CLOBETASOL PROPIONATE 0.5 MG/G
OINTMENT TOPICAL
Qty: 45 G | Refills: 1 | Status: SHIPPED | OUTPATIENT
Start: 2025-08-11

## 2025-08-11 ASSESSMENT — ENCOUNTER SYMPTOMS
GASTROINTESTINAL NEGATIVE: 1
MUSCULOSKELETAL NEGATIVE: 1
ENDOCRINE NEGATIVE: 1
OCCASIONAL FEELINGS OF UNSTEADINESS: 0
CONSTITUTIONAL NEGATIVE: 1
PSYCHIATRIC NEGATIVE: 1
EYES NEGATIVE: 1
NEUROLOGICAL NEGATIVE: 1
DEPRESSION: 0
LOSS OF SENSATION IN FEET: 0
CARDIOVASCULAR NEGATIVE: 1
RESPIRATORY NEGATIVE: 1

## 2025-08-11 ASSESSMENT — PAIN SCALES - GENERAL: PAINLEVEL_OUTOF10: 0-NO PAIN

## 2025-08-14 LAB — BACTERIA UR CULT: NORMAL

## 2025-08-15 ENCOUNTER — TELEPHONE (OUTPATIENT)
Dept: OBSTETRICS AND GYNECOLOGY | Facility: CLINIC | Age: 60
End: 2025-08-15
Payer: COMMERCIAL

## 2025-10-10 ENCOUNTER — APPOINTMENT (OUTPATIENT)
Dept: OBSTETRICS AND GYNECOLOGY | Facility: CLINIC | Age: 60
End: 2025-10-10
Payer: COMMERCIAL

## (undated) DEVICE — TISSUE ADHESIVE, EXOFIN, 1ML

## (undated) DEVICE — GLOVE, SURGICAL, PROTEXIS PI , 6.5, PF, LF

## (undated) DEVICE — SUTURE, MONOCRYL, 3-0, 27 IN, SH, UNDYED

## (undated) DEVICE — PAD, SANITARY, OBSTETRICAL, W/ADHSV STRIP,11 IN,LF

## (undated) DEVICE — RETRACTOR, SURGICAL, RING, PLASTIC, DISPOSABLE

## (undated) DEVICE — GLOVE, SURGICAL, PROTEXIS PI BLUE W/NEUTHERA, 7.0, PF, LF

## (undated) DEVICE — Device

## (undated) DEVICE — CATHETER TRAY, URETHRAL, FOLEY, 2 WAY, GLOVES, PREP, LUBRICANT, DRAINAGE BAG, 16 FR, 5 CC, LF

## (undated) DEVICE — COUNTER, NEEDLE, DOUBLE MAGNET

## (undated) DEVICE — PAD, GROUNDING, ELECTROSURGICAL, W/9 FT CABLE, POLYHESIVE II, ADULT, LF

## (undated) DEVICE — SUTURE, VICRYL PLUS 3-0, SH, 27IN

## (undated) DEVICE — SPONGE, LAP, XRAY DECT, 4IN X 18IN, W/MASTER DMT, STERILE

## (undated) DEVICE — HOLSTER, JET SAFETY

## (undated) DEVICE — SUTURE, VICRYL, 3-0, 27 IN, SH

## (undated) DEVICE — BRIEF, CURITY, XLARGE, MESH

## (undated) DEVICE — DRAPE PACK, LAVH, W/ATTACHED LEGGINGS, W/POUCH, 100 X 114 IN, LF, STERILE